# Patient Record
Sex: MALE | Race: WHITE | Employment: OTHER | URBAN - METROPOLITAN AREA
[De-identification: names, ages, dates, MRNs, and addresses within clinical notes are randomized per-mention and may not be internally consistent; named-entity substitution may affect disease eponyms.]

---

## 2017-08-30 ENCOUNTER — TRANSFERRED RECORDS (OUTPATIENT)
Dept: HEALTH INFORMATION MANAGEMENT | Facility: CLINIC | Age: 67
End: 2017-08-30

## 2017-10-10 DIAGNOSIS — J44.9 COPD (CHRONIC OBSTRUCTIVE PULMONARY DISEASE) (H): Primary | ICD-10-CM

## 2017-11-04 ASSESSMENT — ENCOUNTER SYMPTOMS
RECTAL PAIN: 0
HEMATURIA: 0
DIFFICULTY URINATING: 1
POLYDIPSIA: 0
CHILLS: 0
MYALGIAS: 1
DIARRHEA: 0
WEIGHT LOSS: 0
INSOMNIA: 1
DYSURIA: 1
NAUSEA: 0
COUGH: 1
EYE WATERING: 1
FLANK PAIN: 0
DECREASED APPETITE: 0
JAUNDICE: 0
NECK PAIN: 0
CONSTIPATION: 0
COUGH DISTURBING SLEEP: 1
WEIGHT GAIN: 1
HEMOPTYSIS: 0
SHORTNESS OF BREATH: 1
DYSPNEA ON EXERTION: 1
BOWEL INCONTINENCE: 0
POLYPHAGIA: 1
ABDOMINAL PAIN: 0
SPUTUM PRODUCTION: 1
ARTHRALGIAS: 1
HALLUCINATIONS: 0
MUSCLE CRAMPS: 0
PANIC: 0
NERVOUS/ANXIOUS: 1
EYE PAIN: 0
SNORES LOUDLY: 0
POSTURAL DYSPNEA: 0
VOMITING: 0
ALTERED TEMPERATURE REGULATION: 0
JOINT SWELLING: 0
BLOATING: 0
EYE REDNESS: 0
STIFFNESS: 0
WHEEZING: 1
FEVER: 0
HEARTBURN: 1
INCREASED ENERGY: 1
DECREASED CONCENTRATION: 1
BACK PAIN: 1
NIGHT SWEATS: 0
DOUBLE VISION: 0
FATIGUE: 1
DEPRESSION: 1
EYE IRRITATION: 1
MUSCLE WEAKNESS: 1
BLOOD IN STOOL: 0

## 2017-11-14 NOTE — TELEPHONE ENCOUNTER
APPT INFO    Date /Time: 11/16/17, 1:10pm   Reason for Appt: COPD   Ref Provider/Clinic: MONROE LOPEZ   Are there internal records? If yes, list:    Patient Contact (Y/N) & Call Details: No, referred    Action: Faxed cover sheet.     OUTSIDE RECORDS CHECKLIST     CLINIC NAME COMMENTS REC (x) IMG (x)   [facility]

## 2017-11-15 NOTE — TELEPHONE ENCOUNTER
Records Received From:  [facility]     Date/Exam/Location  (specify location if different)   Office Notes:  1/28/16, 1/31/16, 2/8/16, 4/27/16, 12/12/16, 12/14/16, 2/15/17, 5/24/17, 9/21/17, 9/26/17, 10/18/17   Radiology Reports: - xray chest 4/27/16, 12/12/16  - CT chest 1/30/17   PFT:  2/15/17

## 2017-11-16 ENCOUNTER — OFFICE VISIT (OUTPATIENT)
Dept: PULMONOLOGY | Facility: CLINIC | Age: 67
End: 2017-11-16
Payer: MEDICARE

## 2017-11-16 ENCOUNTER — PRE VISIT (OUTPATIENT)
Dept: PULMONOLOGY | Facility: CLINIC | Age: 67
End: 2017-11-16

## 2017-11-16 VITALS
RESPIRATION RATE: 16 BRPM | OXYGEN SATURATION: 95 % | DIASTOLIC BLOOD PRESSURE: 86 MMHG | HEART RATE: 97 BPM | WEIGHT: 170 LBS | SYSTOLIC BLOOD PRESSURE: 138 MMHG

## 2017-11-16 DIAGNOSIS — J44.9 COPD (CHRONIC OBSTRUCTIVE PULMONARY DISEASE) (H): ICD-10-CM

## 2017-11-16 DIAGNOSIS — J44.9 SEVERE CHRONIC OBSTRUCTIVE PULMONARY DISEASE (H): Primary | ICD-10-CM

## 2017-11-16 PROBLEM — J43.9 COPD (CHRONIC OBSTRUCTIVE PULMONARY DISEASE) WITH EMPHYSEMA (H): Status: ACTIVE | Noted: 2017-11-16

## 2017-11-16 PROBLEM — J44.1 COPD EXACERBATION (H): Status: ACTIVE | Noted: 2017-05-24

## 2017-11-16 PROBLEM — C67.9 PRIMARY CANCER OF BLADDER (H): Status: ACTIVE | Noted: 2017-05-30

## 2017-11-16 PROBLEM — R07.81 RIB PAIN ON RIGHT SIDE: Status: ACTIVE | Noted: 2017-05-24

## 2017-11-16 PROCEDURE — 93005 ELECTROCARDIOGRAM TRACING: CPT | Mod: ZF

## 2017-11-16 PROCEDURE — 93010 ELECTROCARDIOGRAM REPORT: CPT | Mod: ZP | Performed by: INTERNAL MEDICINE

## 2017-11-16 PROCEDURE — 99212 OFFICE O/P EST SF 10 MIN: CPT | Mod: ZF

## 2017-11-16 RX ORDER — TAMSULOSIN HYDROCHLORIDE 0.4 MG/1
CAPSULE ORAL
COMMUNITY
Start: 2017-09-21

## 2017-11-16 RX ORDER — ALBUTEROL SULFATE 90 UG/1
AEROSOL, METERED RESPIRATORY (INHALATION)
COMMUNITY
End: 2017-12-11

## 2017-11-16 RX ORDER — LEVALBUTEROL INHALATION SOLUTION 1.25 MG/3ML
SOLUTION RESPIRATORY (INHALATION)
COMMUNITY
End: 2017-12-11

## 2017-11-16 RX ORDER — THEOPHYLLINE 300 MG/1
TABLET, EXTENDED RELEASE ORAL
COMMUNITY
End: 2017-12-11

## 2017-11-16 RX ORDER — AZITHROMYCIN 250 MG/1
250 TABLET, FILM COATED ORAL DAILY
Qty: 30 TABLET | Refills: 11 | Status: SHIPPED | OUTPATIENT
Start: 2017-11-16 | End: 2017-12-11

## 2017-11-16 RX ORDER — BUPROPION HYDROCHLORIDE 300 MG/1
TABLET ORAL
COMMUNITY

## 2017-11-16 RX ORDER — OMEPRAZOLE 40 MG/1
CAPSULE, DELAYED RELEASE ORAL PRN
COMMUNITY
Start: 2016-02-08

## 2017-11-16 RX ORDER — CALCIUM CARBONATE 500(1250)
1 TABLET ORAL 2 TIMES DAILY
COMMUNITY
End: 2018-02-08

## 2017-11-16 RX ORDER — ALENDRONATE SODIUM 70 MG/1
TABLET ORAL
COMMUNITY
Start: 2017-09-21

## 2017-11-16 ASSESSMENT — PAIN SCALES - GENERAL: PAINLEVEL: MODERATE PAIN (5)

## 2017-11-16 NOTE — MR AVS SNAPSHOT
After Visit Summary   11/16/2017    John Yanes    MRN: [id number]           Patient Information     Date Of Birth          1950        Visit Information        Provider Department      11/16/2017 11:10 AM Holden Bushc MD [facility]        Today's Diagnoses     Severe chronic obstructive pulmonary disease (H)    -  1       Follow-ups after your visit        Additional Services     PULMONARY REHAB REFERRAL                 Follow-up notes from your care team     Return in about 3 months (around 2/16/2018).      Your next 10 appointments already scheduled     Feb 08, 2018 10:40 AM CST   (Arrive by 10:25 AM)   Return Visit with Holden Busch MD   [facility] ([facility])    [address] [place] [id number] [phone]              Future tests that were ordered for you today     Open Future Orders        Priority Expected Expires Ordered    PULMONARY REHAB REFERRAL Routine  11/16/2018 11/16/2017            Who to contact     If you have questions or need follow up information about today's clinic visit or your schedule please contact [facility] directly at [phone].  Normal or non-critical lab and imaging results will be communicated to you by MyChart, letter or phone within 4 business days after the clinic has received the results. If you do not hear from us within 7 days, please contact the clinic through MyChart or phone. If you have a critical or abnormal lab result, we will notify you by phone as soon as possible.  Submit refill requests through [facility] or call your pharmacy and they will forward the refill request to us. Please allow 3 business days for your refill to be completed.          Additional Information About Your Visit        [facility] Information     [facility] gives you secure access to your electronic health record.  If you see a primary care provider, you can also send messages to your care team and make appointments. If you have questions, please call your primary care clinic.  If you do not have a primary care provider, please call [phone] and they will assist you.        Care EveryWhere ID     This is your Care EveryWhere ID. This could be used by other organizations to access your [facility] medical records  [id number]        Your Vitals Were     Pulse Respirations Pulse Oximetry             97 16 95%          Blood Pressure from Last 3 Encounters:   11/16/17 138/86    Weight from Last 3 Encounters:   11/16/17 77.1 kg (170 lb)              We Performed the Following     EKG 12-lead complete w/read - Clinics          Today's Medication Changes          These changes are accurate as of: 11/16/17 12:50 PM.  If you have any questions, ask your nurse or doctor.               Start taking these medicines.        Dose/Directions    azithromycin 250 MG tablet   Commonly known as:  ZITHROMAX   Used for:  Severe chronic obstructive pulmonary disease (H)   Started by:  Holden Busch MD        Dose:  250 mg   Take 1 tablet (250 mg) by mouth daily   Quantity:  30 tablet   Refills:  11            Where to get your medicines      These medications were sent to [id number] - [place], [place] - [address]  [address] [facility] [address]     Phone:  [phone]     azithromycin 250 MG tablet                Primary Care Provider Office Phone # Fax #    Elvis Ifeanyi Gonzalez [phone]       [facility] [address] [address]        Equal Access to Services     [facility] AH: Hadii wang ku hadasho Sotammyali, waaxda luqadaha, qaybta kaalmada adeegyada, jovita quick. So [facility] [phone].    ATENCIÓN: Si habla español, tiene a savage disposición servicios gratuitos de asistencia lingüística. Llame al [phone].    We comply with applicable federal civil  rights laws and [place] laws. We do not discriminate on the basis of race, color, national origin, age, disability, sex, sexual orientation, or gender identity.            Thank you!     Thank you for choosing [facility]  for your care. Our goal is always to provide you with excellent care. Hearing back from our patients is one way we can continue to improve our services. Please take a few minutes to complete the written survey that you may receive in the mail after your visit with us. Thank you!             Your Updated Medication List - Protect others around you: Learn how to safely use, store and throw away your medicines at www.disposemymeds.org.          This list is accurate as of: 11/16/17 12:50 PM.  Always use your most recent med list.                   Brand Name Dispense Instructions for use Diagnosis    ADVAIR DISKUS 250-50 MCG/DOSE diskus inhaler   Generic drug:  fluticasone-salmeterol           alendronate 70 MG tablet    FOSAMAX          azithromycin 250 MG tablet    ZITHROMAX    30 tablet    Take 1 tablet (250 mg) by mouth daily    Severe chronic obstructive pulmonary disease (H)       buPROPion 300 MG 24 hr tablet    WELLBUTRIN XL          calcium carbonate 1250 MG tablet    OS-KISOHRE 500 mg [place]. [place]     Take 1 tablet by mouth 2 times daily        INCRUSE ELLIPTA 62.5 MCG/INH oral inhaler   Generic drug:  umeclidinium           levalbuterol 1.25 MG/3ML neb solution    XOPENEX          medical cannabis (Patient's own supply.  Not a prescription)      1 capsule (This is NOT a prescription, and does not certify that the patient has a qualifying medical condition for medical cannabis.  The purpose of this order is  to document that the patient reports taking medical cannabis.)        omeprazole 40 MG capsule    priLOSEC          tamsulosin 0.4 MG capsule    FLOMAX          theophylline 300 MG 12 hr tablet    THEODUR          VENTOLIN  (90 BASE) MCG/ACT Inhaler    Generic drug:  albuterol

## 2017-11-16 NOTE — PROGRESS NOTES
[facility]  Pulmonary Medicine  Visit Clinic Note  November 16, 2017         ASSESSMENT & PLAN       Severe COPD: Mr Yanes has GOLD stage III COPD.  PFTs show a lot of gas trapping and hyperinflation.  He is quite symptomatic with minimal activity.      He has frequent exacerbations.      Currently he is on a good inhaler regimen for his degree of airflow obstruction and exacerbation history.  This includes an ICS/LABA/LAMA.  He also has a rescue inhaler and nebulizer with xopenex.  We discussed using azithromycin or roflumilast to try and prevent exacerbations.  I elected to start azithromycin at 250 mg daily. His QTc was 410 ms today in clinic.  He is on theophylline, which we could consider changing to roflumilast later on.      We also discussed advanced COPD therapies.  These therapies include surgical lung volume reduction surgery, bronchoscopic lung volume reduction, or lung transplant.      There are no open trials for bronchoscopic lung volume reduction that I am aware of here, or at the [facility].  We don't offer lung volume reduction surgery here.  We did discuss the option of lung transplantation, and spoke about some of the risks and benefits.  Unfortunately, with his recent diagnosis of bladder cancer, the possibility of lung transplant will need to be postponed at least a couple of years.  He is getting active treatment for this right now.  I have discussed these options with Mr Yanes.      I have recommended that he participate in pulmonary rehab again, in addition to starting azithromycin.  He will follow up with me in 3 months with a six minute walk, chest CT, and an ABG.      When he follows up then, we will also discuss potentially switching inhalers to nebulizers and sleep study.      Sebastian Busch MD     I spent a total of 70 minutes face-to-face with John Liconaandra during today's office visit.  Over 50% of this time was spent counseling the patient and/or coordinating  care regarding his disease, and possible interventions for it.  See note for details.         Today's visit note:     Chief Complaint: John Yanes is a 67 year old year old male who is being seen for Consult (Patient is being seen for COPD consultation)      HISTORY OF PRESENT ILLNESS:  67 year old male with a history of severe COPD presents to clinic today for evaluation of his lung disease and consideration of advanced therapies.      He tells me that he has had a significant decline in his symptoms over the last year or so.  He was admitted to the hospital in 1/2016 for a pneumonia.  Since then, he has been declining.  He thinks in 2016 he was treated for about 3 -4 COPD exacerbations, and so far in 2017 he has had 2 exacerbations.  He notes that after each of these he has not been able to get back to where he was before.      He has never been intubated for respiratory failure.      He is pretty limited in his exercise tolerance.  If he bends over, he gets out of breath very quickely.  He has difficulty walking up steps and taking a shower. Sometimes will walk only 20 feet and start to feel the dyspnea.  Doesn't think he could make it around a city block.     He completed pulmonary rehab 6/2016, and did feel better after this.    .    Uses oxygen mostly during the day, not at night.     He was diagnosed with bladder cancer last year.  Had some tumors surgically removed, and now getting bCG therapy.  Going back at the end of this month for another cystoscopy.          Past Medical and Surgical History:     Past Medical History:   Diagnosis Date     Bladder cancer (H)      Chronic pain      Osteoporosis 06/2016     Vertebral fracture, osteoporotic (H) 06/2016    T5     No past surgical history on file.        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                 Social History Main Topics     Smoking status: Former Smoker     Packs/day: 2.00     Years: 35.00     Types: Cigarettes     Quit date: 1/1/2001     Smokeless tobacco: Never Used     Alcohol use Not on file     Drug use: Yes     Special: Marijuana      Comment: medical use for pain (takes pills)     Sexual activity: Not on file     Other Topics Concern     Not on file     Social History Narrative     No narrative on file            Medications:     Current Outpatient Prescriptions   Medication     fluticasone-salmeterol (ADVAIR DISKUS) 250-50 MCG/DOSE diskus inhaler     alendronate (FOSAMAX) 70 MG tablet     buPROPion (WELLBUTRIN XL) 300 MG 24 hr tablet     umeclidinium (INCRUSE ELLIPTA) 62.5 MCG/INH oral inhaler     levalbuterol (XOPENEX) 1.25 MG/3ML neb solution     omeprazole (PRILOSEC) 40 MG capsule     tamsulosin (FLOMAX) 0.4 MG capsule     theophylline (THEODUR) 300 MG 12 hr tablet     albuterol (VENTOLIN HFA) 108 (90 BASE) MCG/ACT Inhaler     medical cannabis (Patient's own supply.  Not a prescription)     calcium carbonate (OS-KISHORE 500 MG [place]. [place]) 1250 MG tablet     No current facility-administered medications for this visit.             Review of Systems:       Answers for HPI/ROS submitted by the patient on 11/4/2017   General Symptoms: Yes  Skin Symptoms: No  HENT Symptoms: No  EYE SYMPTOMS: Yes  HEART SYMPTOMS: No  LUNG SYMPTOMS: Yes  INTESTINAL SYMPTOMS: Yes  URINARY SYMPTOMS: Yes  REPRODUCTIVE SYMPTOMS: No  SKELETAL SYMPTOMS: Yes  BLOOD SYMPTOMS: No  NERVOUS SYSTEM SYMPTOMS: No  MENTAL HEALTH SYMPTOMS: Yes  Fever: No  Loss of appetite: No  Weight loss: No  Weight gain: Yes  Fatigue: Yes  Night sweats: No  Chills: No  Increased stress: Yes  Excessive hunger:  Yes  Excessive thirst: No  Feeling hot or cold when others believe the temperature is normal: No  Loss of height: No  Post-operative complications: No  Surgical site pain: No  Hallucinations: No  Change in or Loss of Energy: Yes  Hyperactivity: No  Confusion: No  Eye pain: No  Vision loss: Yes  Watery eyes: Yes  Eye bulging: No  Double vision: No  Flashing of lights: Yes  Spots: No  Floaters: No  Redness: No  Crossed eyes: No  Tunnel Vision: No  Yellowing of eyes: No  Eye irritation: Yes  Cough: Yes  Sputum or phlegm: Yes  Coughing up blood: No  Difficulty breating or shortness of breath: Yes  Snoring: No  Wheezing: Yes  Difficulty breathing on exertion: Yes  Nighttime Cough: Yes  Difficulty breathing when lying flat: No  Heart burn or indigestion: Yes  Nausea: No  Vomiting: No  Abdominal pain: No  Bloating: No  Constipation: No  Diarrhea: No  Blood in stool: No  Black stools: No  Rectal or Anal pain: No  Fecal incontinence: No  Yellowing of skin or eyes: No  Vomit with blood: No  Change in stools: No  Trouble holding urine or incontinence: Yes  Pain or burning: Yes  Trouble starting or stopping: Yes  Increased frequency of urination: Yes  Blood in urine: No  Decreased frequency of urination: No  Frequent nighttime urination: Yes  Flank pain: No  Difficulty emptying bladder: Yes  Back pain: Yes  Muscle aches: Yes  Neck pain: No  Swollen joints: No  Joint pain: Yes  Bone pain: Yes  Muscle cramps: No  Muscle weakness: Yes  Joint stiffness: No  Bone fracture: Yes  Nervous or Anxious: Yes  Depression: Yes  Trouble sleeping: Yes  Trouble thinking or concentrating: Yes  Mood changes: No  Panic attacks: No      PHYSICAL EXAM:  /86 (BP Location: Right arm, Patient Position: Chair, Cuff Size: Adult Large)  Pulse 97  Resp 16  Wt 77.1 kg (170 lb)  SpO2 95%     General: Pleasant, No apparent distress  Eyes: Anicteric  Nose: Nasal mucosa with no edema or hyperemia.  No polyps  Ears: Hearing grossly normal  Mouth: Oral  mucosa is moist, without any lesions. No oropharyngeal exudate.  Neck: supple, no thyromegaly  Lymphatics: No cervical or supraclavicular nodes  Respiratory: Chronically contracted trapezius muscle. Normal respiratory rate, but use of accessory muscles with normal respiration.  Dimished breath sounds with small bilateral wheeze biapically.  Barrel chested  Cardiac: RRR, normal S1, S2. No murmurs. No JVD  Abdomen: Soft, NT/ND   Musculoskeletal: Extremities normal. No clubbing. No cyanosis. No edema.  Skin: No rash on limited exam  Neuro: Normal mentation. Normal speech.  Psych:Normal affect           Data:   All laboratory and imaging data reviewed.      PFT:   FEV1/FVC ratio 0.30. FEV1 0.97 (33% predicted). FVC 3.26 (85% predicted). DLCO 55%  RV 7.38 L (286%), TLC 11.42 (165%)      PFT Interpretation:  GOLD Stage III (severe) airflow obstruction.  Significant gas trapping and hyperinflation. Low DLCO  Valid Maneuver    CXR: Hyperinflation, PITA nodules    Chest CT: None available.     Recent Results (from the past 168 hour(s))   General PFT Lab (Please always keep checked)    Collection Time: 11/16/17  9:45 AM   Result Value Ref Range    FVC-Pred 3.82 L    FVC-Pre 3.26 L    FVC-%Pred-Pre 85 %    FEV1-Pre 0.97 L    FEV1-%Pred-Pre 33 %    FEV1FVC-Pred 77 %    FEV1FVC-Pre 30 %    FEFMax-Pred 8.25 L/sec    FEFMax-Pre 2.03 L/sec    FEFMax-%Pred-Pre 24 %    FEF2575-Pred 2.32 L/sec    FEF2575-Pre 0.37 L/sec    MDU3219-%Pred-Pre 15 %    FEF2575-Post 0.43 L/sec    XZO7795-%Pred-Post 18 %    ExpTime-Pre 9.89 sec    FIFMax-Pre 2.30 L/sec    VC-Pred 4.61 L    VC-Pre 4.04 L    VC-%Pred-Pre 87 %    IC-Pred 3.47 L    IC-Pre 2.65 L    IC-%Pred-Pre 76 %    ERV-Pred 1.14 L    ERV-Pre 1.39 L    ERV-%Pred-Pre 121 %    FEV1FEV6-Pred 78 %    FEV1FEV6-Pre 37 %    FRCPleth-Pred 3.63 L    FRCPleth-Pre 8.77 L    FRCPleth-%Pred-Pre 241 %    RVPleth-Pred 2.58 L    RVPleth-Pre 7.38 L    RVPleth-%Pred-Pre 286 %    TLCPleth-Pred 6.92 L     TLCPleth-Pre 11.42 L    TLCPleth-%Pred-Pre 165 %    DLCOunc-Pred 25.99 ml/min/mmHg    DLCOunc-Pre 14.10 ml/min/mmHg    DLCOunc-%Pred-Pre 54 %    VA-Pre 5.52 L    VA-%Pred-Pre 83 %    FEV1SVC-Pred 64 %    FEV1SVC-Pre 24 %

## 2017-11-16 NOTE — LETTER
11/16/2017       RE: John Yanes  [address]   [place] [address]     Dear Colleague,    Thank you for referring your patient, John Yanes, to the [facility] at [facility]. Please see a copy of my visit note below.    [facility]  Pulmonary Medicine  Visit Clinic Note  November 16, 2017         ASSESSMENT & PLAN       Severe COPD: Mr Yanes has GOLD stage III COPD.  PFTs show a lot of gas trapping and hyperinflation.  He is quite symptomatic with minimal activity.      He has frequent exacerbations.      Currently he is on a good inhaler regimen for his degree of airflow obstruction and exacerbation history.  This includes an ICS/LABA/LAMA.  He also has a rescue inhaler and nebulizer with xopenex.  We discussed using azithromycin or roflumilast to try and prevent exacerbations.  I elected to start azithromycin at 250 mg daily. His QTc was 410 ms today in clinic.  He is on theophylline, which we could consider changing to roflumilast later on.      We also discussed advanced COPD therapies.  These therapies include surgical lung volume reduction surgery, bronchoscopic lung volume reduction, or lung transplant.      There are no open trials for bronchoscopic lung volume reduction that I am aware of here, or at the [facility].  We don't offer lung volume reduction surgery here.  We did discuss the option of lung transplantation, and spoke about some of the risks and benefits.  Unfortunately, with his recent diagnosis of bladder cancer, the possibility of lung transplant will need to be postponed at least a couple of years.  He is getting active treatment for this right now.  I have discussed these options with Mr Yanes.      I have recommended that he participate in pulmonary rehab again, in addition to starting azithromycin.  He will follow up with me in 3 months with a six minute walk, chest CT, and an ABG.      When he  follows up then, we will also discuss potentially switching inhalers to nebulizers and sleep study.      Sebastian Busch MD     I spent a total of 70 minutes face-to-face with John Yanes during today's office visit.  Over 50% of this time was spent counseling the patient and/or coordinating care regarding his disease, and possible interventions for it.  See note for details.         Today's visit note:     Chief Complaint: John Yanes is a 67 year old year old male who is being seen for Consult (Patient is being seen for COPD consultation)      HISTORY OF PRESENT ILLNESS:  67 year old male with a history of severe COPD presents to clinic today for evaluation of his lung disease and consideration of advanced therapies.      He tells me that he has had a significant decline in his symptoms over the last year or so.  He was admitted to the hospital in 1/2016 for a pneumonia.  Since then, he has been declining.  He thinks in 2016 he was treated for about 3 -4 COPD exacerbations, and so far in 2017 he has had 2 exacerbations.  He notes that after each of these he has not been able to get back to where he was before.      He has never been intubated for respiratory failure.      He is pretty limited in his exercise tolerance.  If he bends over, he gets out of breath very quickely.  He has difficulty walking up steps and taking a shower. Sometimes will walk only 20 feet and start to feel the dyspnea.  Doesn't think he could make it around a city block.     He completed pulmonary rehab 6/2016, and did feel better after this.    .    Uses oxygen mostly during the day, not at night.     He was diagnosed with bladder cancer last year.  Had some tumors surgically removed, and now getting bCG therapy.  Going back at the end of this month for another cystoscopy.          Past Medical and Surgical History:     Past Medical History:   Diagnosis Date     Bladder cancer (H)      Chronic pain      Osteoporosis 06/2016      Vertebral fracture, osteoporotic (H) 06/2016    T5     No past surgical history on file.        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                 Social History Main Topics     Smoking status: Former Smoker     Packs/day: 2.00     Years: 35.00     Types: Cigarettes     Quit date: 1/1/2001     Smokeless tobacco: Never Used     Alcohol use Not on file     Drug use: Yes     Special: Marijuana      Comment: medical use for pain (takes pills)     Sexual activity: Not on file     Other Topics Concern     Not on file     Social History Narrative     No narrative on file            Medications:     Current Outpatient Prescriptions   Medication     fluticasone-salmeterol (ADVAIR DISKUS) 250-50 MCG/DOSE diskus inhaler     alendronate (FOSAMAX) 70 MG tablet     buPROPion (WELLBUTRIN XL) 300 MG 24 hr tablet     umeclidinium (INCRUSE ELLIPTA) 62.5 MCG/INH oral inhaler     levalbuterol (XOPENEX) 1.25 MG/3ML neb solution     omeprazole (PRILOSEC) 40 MG capsule     tamsulosin (FLOMAX) 0.4 MG capsule     theophylline (THEODUR) 300 MG 12 hr tablet     albuterol (VENTOLIN HFA) 108 (90 BASE) MCG/ACT Inhaler     medical cannabis (Patient's own supply.  Not a prescription)     calcium carbonate (OS-KISHORE 500 MG [place] [place]) 1250 MG tablet     No current facility-administered medications for this visit.             Review of Systems:       Answers for HPI/ROS submitted by the patient on 11/4/2017   General Symptoms: Yes  Skin Symptoms: No  HENT Symptoms: No  EYE SYMPTOMS: Yes  HEART SYMPTOMS: No  LUNG SYMPTOMS: Yes  INTESTINAL SYMPTOMS: Yes  URINARY  SYMPTOMS: Yes  REPRODUCTIVE SYMPTOMS: No  SKELETAL SYMPTOMS: Yes  BLOOD SYMPTOMS: No  NERVOUS SYSTEM SYMPTOMS: No  MENTAL HEALTH SYMPTOMS: Yes  Fever: No  Loss of appetite: No  Weight loss: No  Weight gain: Yes  Fatigue: Yes  Night sweats: No  Chills: No  Increased stress: Yes  Excessive hunger: Yes  Excessive thirst: No  Feeling hot or cold when others believe the temperature is normal: No  Loss of height: No  Post-operative complications: No  Surgical site pain: No  Hallucinations: No  Change in or Loss of Energy: Yes  Hyperactivity: No  Confusion: No  Eye pain: No  Vision loss: Yes  Watery eyes: Yes  Eye bulging: No  Double vision: No  Flashing of lights: Yes  Spots: No  Floaters: No  Redness: No  Crossed eyes: No  Tunnel Vision: No  Yellowing of eyes: No  Eye irritation: Yes  Cough: Yes  Sputum or phlegm: Yes  Coughing up blood: No  Difficulty breating or shortness of breath: Yes  Snoring: No  Wheezing: Yes  Difficulty breathing on exertion: Yes  Nighttime Cough: Yes  Difficulty breathing when lying flat: No  Heart burn or indigestion: Yes  Nausea: No  Vomiting: No  Abdominal pain: No  Bloating: No  Constipation: No  Diarrhea: No  Blood in stool: No  Black stools: No  Rectal or Anal pain: No  Fecal incontinence: No  Yellowing of skin or eyes: No  Vomit with blood: No  Change in stools: No  Trouble holding urine or incontinence: Yes  Pain or burning: Yes  Trouble starting or stopping: Yes  Increased frequency of urination: Yes  Blood in urine: No  Decreased frequency of urination: No  Frequent nighttime urination: Yes  Flank pain: No  Difficulty emptying bladder: Yes  Back pain: Yes  Muscle aches: Yes  Neck pain: No  Swollen joints: No  Joint pain: Yes  Bone pain: Yes  Muscle cramps: No  Muscle weakness: Yes  Joint stiffness: No  Bone fracture: Yes  Nervous or Anxious: Yes  Depression: Yes  Trouble sleeping: Yes  Trouble thinking or concentrating: Yes  Mood changes: No  Panic attacks: No      PHYSICAL EXAM:  BP  138/86 (BP Location: Right arm, Patient Position: Chair, Cuff Size: Adult Large)  Pulse 97  Resp 16  Wt 77.1 kg (170 lb)  SpO2 95%     General: Pleasant, No apparent distress  Eyes: Anicteric  Nose: Nasal mucosa with no edema or hyperemia.  No polyps  Ears: Hearing grossly normal  Mouth: Oral mucosa is moist, without any lesions. No oropharyngeal exudate.  Neck: supple, no thyromegaly  Lymphatics: No cervical or supraclavicular nodes  Respiratory: Chronically contracted trapezius muscle. Normal respiratory rate, but use of accessory muscles with normal respiration.  Dimished breath sounds with small bilateral wheeze biapically.  Barrel chested  Cardiac: RRR, normal S1, S2. No murmurs. No JVD  Abdomen: Soft, NT/ND   Musculoskeletal: Extremities normal. No clubbing. No cyanosis. No edema.  Skin: No rash on limited exam  Neuro: Normal mentation. Normal speech.  Psych:Normal affect           Data:   All laboratory and imaging data reviewed.      PFT:   FEV1/FVC ratio 0.30. FEV1 0.97 (33% predicted). FVC 3.26 (85% predicted). DLCO 55%  RV 7.38 L (286%), TLC 11.42 (165%)      PFT Interpretation:  GOLD Stage III (severe) airflow obstruction.  Significant gas trapping and hyperinflation. Low DLCO  Valid Maneuver    CXR: Hyperinflation, PITA nodules    Chest CT: None available.     Recent Results (from the past 168 hour(s))   General PFT Lab (Please always keep checked)    Collection Time: 11/16/17  9:45 AM   Result Value Ref Range    FVC-Pred 3.82 L    FVC-Pre 3.26 L    FVC-%Pred-Pre 85 %    FEV1-Pre 0.97 L    FEV1-%Pred-Pre 33 %    FEV1FVC-Pred 77 %    FEV1FVC-Pre 30 %    FEFMax-Pred 8.25 L/sec    FEFMax-Pre 2.03 L/sec    FEFMax-%Pred-Pre 24 %    FEF2575-Pred 2.32 L/sec    FEF2575-Pre 0.37 L/sec    AIM3954-%Pred-Pre 15 %    FEF2575-Post 0.43 L/sec    INM9306-%Pred-Post 18 %    ExpTime-Pre 9.89 sec    FIFMax-Pre 2.30 L/sec    VC-Pred 4.61 L    VC-Pre 4.04 L    VC-%Pred-Pre 87 %    IC-Pred 3.47 L    IC-Pre 2.65 L     IC-%Pred-Pre 76 %    ERV-Pred 1.14 L    ERV-Pre 1.39 L    ERV-%Pred-Pre 121 %    FEV1FEV6-Pred 78 %    FEV1FEV6-Pre 37 %    FRCPleth-Pred 3.63 L    FRCPleth-Pre 8.77 L    FRCPleth-%Pred-Pre 241 %    RVPleth-Pred 2.58 L    RVPleth-Pre 7.38 L    RVPleth-%Pred-Pre 286 %    TLCPleth-Pred 6.92 L    TLCPleth-Pre 11.42 L    TLCPleth-%Pred-Pre 165 %    DLCOunc-Pred 25.99 ml/min/mmHg    DLCOunc-Pre 14.10 ml/min/mmHg    DLCOunc-%Pred-Pre 54 %    VA-Pre 5.52 L    VA-%Pred-Pre 83 %    FEV1SVC-Pred 64 %    FEV1SVC-Pre 24 %             Again, thank you for allowing me to participate in the care of your patient.      Sincerely,    Holden Busch MD

## 2017-11-16 NOTE — NURSING NOTE
Chief Complaint   Patient presents with     Consult     Patient is being seen for COPD consultation      Renée Barrios CMA at 1:18 PM on 11/16/2017

## 2017-11-17 LAB — INTERPRETATION ECG - MUSE: NORMAL

## 2017-11-20 ENCOUNTER — TELEPHONE (OUTPATIENT)
Dept: PULMONOLOGY | Facility: CLINIC | Age: 67
End: 2017-11-20

## 2017-11-20 NOTE — TELEPHONE ENCOUNTER
Per Patients request Pulmonary Rehab orders faxed to [employer]. Clinic notes, PFTs, order, and demographic information faxed to [email or web address]. They will contact patient directly for scheduling.

## 2017-11-21 LAB
DLCOUNC-%PRED-PRE: 54 %
DLCOUNC-PRE: 14.1 ML/MIN/MMHG
DLCOUNC-PRED: 25.99 ML/MIN/MMHG
ERV-%PRED-PRE: 121 %
ERV-PRE: 1.39 L
ERV-PRED: 1.14 L
EXPTIME-PRE: 9.89 SEC
FEF2575-%PRED-POST: 18 %
FEF2575-%PRED-PRE: 15 %
FEF2575-POST: 0.43 L/SEC
FEF2575-PRE: 0.37 L/SEC
FEF2575-PRED: 2.32 L/SEC
FEFMAX-%PRED-PRE: 24 %
FEFMAX-PRE: 2.03 L/SEC
FEFMAX-PRED: 8.25 L/SEC
FEV1-%PRED-PRE: 33 %
FEV1-PRE: 0.97 L
FEV1FEV6-PRE: 37 %
FEV1FEV6-PRED: 78 %
FEV1FVC-PRE: 30 %
FEV1FVC-PRED: 77 %
FEV1SVC-PRE: 24 %
FEV1SVC-PRED: 64 %
FIFMAX-PRE: 2.3 L/SEC
FRCPLETH-%PRED-PRE: 241 %
FRCPLETH-PRE: 8.77 L
FRCPLETH-PRED: 3.63 L
FVC-%PRED-PRE: 85 %
FVC-PRE: 3.26 L
FVC-PRED: 3.82 L
IC-%PRED-PRE: 76 %
IC-PRE: 2.65 L
IC-PRED: 3.47 L
RVPLETH-%PRED-PRE: 286 %
RVPLETH-PRE: 7.38 L
RVPLETH-PRED: 2.58 L
TLCPLETH-%PRED-PRE: 165 %
TLCPLETH-PRE: 11.42 L
TLCPLETH-PRED: 6.92 L
VA-%PRED-PRE: 83 %
VA-PRE: 5.52 L
VC-%PRED-PRE: 87 %
VC-PRE: 4.04 L
VC-PRED: 4.61 L

## 2017-11-29 ENCOUNTER — TRANSFERRED RECORDS (OUTPATIENT)
Dept: HEALTH INFORMATION MANAGEMENT | Facility: CLINIC | Age: 67
End: 2017-11-29

## 2017-12-18 DIAGNOSIS — J44.9 SEVERE CHRONIC OBSTRUCTIVE PULMONARY DISEASE (H): Primary | ICD-10-CM

## 2017-12-18 RX ORDER — BUDESONIDE 0.5 MG/2ML
0.5 INHALANT ORAL 2 TIMES DAILY
Qty: 360 ML | Refills: 4 | Status: SHIPPED | OUTPATIENT
Start: 2017-12-18 | End: 2019-01-09

## 2018-01-25 ASSESSMENT — ENCOUNTER SYMPTOMS
COUGH: 1
DECREASED CONCENTRATION: 0
HEMOPTYSIS: 0
SWOLLEN GLANDS: 0
NERVOUS/ANXIOUS: 1
JOINT SWELLING: 0
WHEEZING: 1
DYSURIA: 1
STIFFNESS: 0
INSOMNIA: 1
SNORES LOUDLY: 0
DIFFICULTY URINATING: 1
MYALGIAS: 1
MUSCLE CRAMPS: 0
EYE REDNESS: 0
POSTURAL DYSPNEA: 0
COUGH DISTURBING SLEEP: 1
EYE PAIN: 0
MUSCLE WEAKNESS: 0
DYSPNEA ON EXERTION: 1
EYE IRRITATION: 1
SPUTUM PRODUCTION: 1
BRUISES/BLEEDS EASILY: 1
DEPRESSION: 0
ARTHRALGIAS: 1
BACK PAIN: 0
FLANK PAIN: 0
DOUBLE VISION: 0
HEMATURIA: 0
PANIC: 0
NECK PAIN: 1
EYE WATERING: 0
SHORTNESS OF BREATH: 1

## 2018-02-08 ENCOUNTER — RADIANT APPOINTMENT (OUTPATIENT)
Dept: CT IMAGING | Facility: CLINIC | Age: 68
End: 2018-02-08
Payer: MEDICARE

## 2018-02-08 ENCOUNTER — OFFICE VISIT (OUTPATIENT)
Dept: PULMONOLOGY | Facility: CLINIC | Age: 68
End: 2018-02-08
Payer: MEDICARE

## 2018-02-08 ENCOUNTER — CARE COORDINATION (OUTPATIENT)
Dept: PULMONOLOGY | Facility: CLINIC | Age: 68
End: 2018-02-08

## 2018-02-08 VITALS
BODY MASS INDEX: 24.91 KG/M2 | HEART RATE: 91 BPM | HEIGHT: 70 IN | OXYGEN SATURATION: 94 % | WEIGHT: 174 LBS | RESPIRATION RATE: 16 BRPM | DIASTOLIC BLOOD PRESSURE: 81 MMHG | SYSTOLIC BLOOD PRESSURE: 133 MMHG

## 2018-02-08 DIAGNOSIS — J43.9 COPD (CHRONIC OBSTRUCTIVE PULMONARY DISEASE) WITH EMPHYSEMA (H): Primary | ICD-10-CM

## 2018-02-08 DIAGNOSIS — J96.11 CHRONIC RESPIRATORY FAILURE WITH HYPOXIA (H): ICD-10-CM

## 2018-02-08 DIAGNOSIS — Z85.51 PERSONAL HISTORY OF MALIGNANT NEOPLASM OF BLADDER: ICD-10-CM

## 2018-02-08 DIAGNOSIS — R06.02 SOB (SHORTNESS OF BREATH): ICD-10-CM

## 2018-02-08 DIAGNOSIS — J44.9 COPD (CHRONIC OBSTRUCTIVE PULMONARY DISEASE) (H): Primary | ICD-10-CM

## 2018-02-08 DIAGNOSIS — J44.9 SEVERE CHRONIC OBSTRUCTIVE PULMONARY DISEASE (H): ICD-10-CM

## 2018-02-08 DIAGNOSIS — J43.2 CENTRILOBULAR EMPHYSEMA (H): ICD-10-CM

## 2018-02-08 DIAGNOSIS — J44.9 STAGE 4 VERY SEVERE COPD BY GOLD CLASSIFICATION (H): Primary | ICD-10-CM

## 2018-02-08 DIAGNOSIS — R91.8 PULMONARY NODULES: ICD-10-CM

## 2018-02-08 LAB
6 MIN WALK (FT): 1035 FT
6 MIN WALK (M): 315 M
BASE EXCESS BLDA CALC-SCNC: 2.7 MMOL/L
BASOPHILS # BLD AUTO: 0 10E9/L (ref 0–0.2)
BASOPHILS NFR BLD AUTO: 0.4 %
COHGB MFR BLD: 1.2 % (ref 0–2)
DIFFERENTIAL METHOD BLD: NORMAL
EOSINOPHIL # BLD AUTO: 0.3 10E9/L (ref 0–0.7)
EOSINOPHIL NFR BLD AUTO: 5.5 %
ERYTHROCYTE [DISTWIDTH] IN BLOOD BY AUTOMATED COUNT: 12.9 % (ref 10–15)
HCO3 BLD-SCNC: 28 MMOL/L (ref 21–28)
HCT VFR BLD AUTO: 45.3 % (ref 40–53)
HGB BLD-MCNC: 14.3 G/DL (ref 13.3–17.7)
HGB BLD-MCNC: 14.9 G/DL (ref 13.3–17.7)
IMM GRANULOCYTES # BLD: 0 10E9/L (ref 0–0.4)
IMM GRANULOCYTES NFR BLD: 0.2 %
LYMPHOCYTES # BLD AUTO: 1.9 10E9/L (ref 0.8–5.3)
LYMPHOCYTES NFR BLD AUTO: 35.8 %
MCH RBC QN AUTO: 30.9 PG (ref 26.5–33)
MCHC RBC AUTO-ENTMCNC: 32.9 G/DL (ref 31.5–36.5)
MCV RBC AUTO: 94 FL (ref 78–100)
METHGB MFR BLD: 0.7 % (ref 0–3)
MONOCYTES # BLD AUTO: 0.5 10E9/L (ref 0–1.3)
MONOCYTES NFR BLD AUTO: 9.8 %
NEUTROPHILS # BLD AUTO: 2.5 10E9/L (ref 1.6–8.3)
NEUTROPHILS NFR BLD AUTO: 48.3 %
NRBC # BLD AUTO: 0 10*3/UL
NRBC BLD AUTO-RTO: 0 /100
O2/TOTAL GAS SETTING VFR VENT: 21 %
PCO2 BLD: 47 MM HG (ref 35–45)
PH BLD: 7.39 PH (ref 7.35–7.45)
PLATELET # BLD AUTO: 209 10E9/L (ref 150–450)
PO2 BLD: 72 MM HG (ref 80–105)
RADIOLOGIST FLAGS: NORMAL
RBC # BLD AUTO: 4.82 10E12/L (ref 4.4–5.9)
WBC # BLD AUTO: 5.2 10E9/L (ref 4–11)

## 2018-02-08 PROCEDURE — 85018 HEMOGLOBIN: CPT

## 2018-02-08 PROCEDURE — G0463 HOSPITAL OUTPT CLINIC VISIT: HCPCS | Mod: ZF

## 2018-02-08 PROCEDURE — 36415 COLL VENOUS BLD VENIPUNCTURE: CPT

## 2018-02-08 PROCEDURE — 82375 ASSAY CARBOXYHB QUANT: CPT

## 2018-02-08 PROCEDURE — 85025 COMPLETE CBC W/AUTO DIFF WBC: CPT

## 2018-02-08 PROCEDURE — 82803 BLOOD GASES ANY COMBINATION: CPT

## 2018-02-08 PROCEDURE — 83050 HGB METHEMOGLOBIN QUAN: CPT

## 2018-02-08 ASSESSMENT — PAIN SCALES - GENERAL: PAINLEVEL: MODERATE PAIN (4)

## 2018-02-08 NOTE — PROGRESS NOTES
Pt was in clinic today and voiced concern over current oxygen set up. RN called and spoke with [employer] who advised they have an eclipse he can have right away, if he wants something smaller would have to titrate for smaller device but it is currently on back order. RN spoke with patient who voiced would like to try smaller device. He will call oxygen company and start process. RN will send orders over to titrate for POC.   Marylin Felipe RN BSN

## 2018-02-08 NOTE — LETTER
2/8/2018       RE: John Yanes  [id number] [place] [facility] [address]     Dear Colleague,    Thank you for referring your patient, John Yanes, to the [facility] at [facility]. Please see a copy of my visit note below.    [facility]  Pulmonary Medicine  Visit Clinic Note  February 8, 2018         ASSESSMENT & PLAN       COPD - GOLD Stage 4 with an FEV1 of 28% predicted.  Mixed emphysema as well as chronic bronchitis.  He is a frequent exacerbator, but has enjoyed relatively good symptomatic control since switching to nebulized therapy as well as chronic azithromycin.  He has chronic hypoxemic respiratory failure, especially with exertion.  Additionally he has a chronic hypercapnic respiratory failure, with a PCO2 of 47 mmHg.  He is taking his medications every day and participating in pulmonary rehab.    He is interested in lung transplantation, and I do think that he would be a good candidate for a lung transplant evaluation with the exception of his recent history of bladder cancer.  The details of this diagnosis are unclear to me.  He states that it is was only a local tumor did not have any invasion.  I do not have any records of this, nor do I know when he has been declared cancer free by his urologist.  The other outstanding issue is the bilateral pulmonary nodules that were identified on today's CAT scan of his lungs.  He has had previous chest CT scans that do demonstrate nodules, but I will need to get actual images to compare to today's image to make sure things have been stable over the last 2 years.  He additionally states that he has had PET scans done in [place] approximately 10 years ago for nodules.  We will try to get these reports as well.  These details are important for me to know prior to refer him to the lung transplant service for consideration.    At this point I would like him to continue with  the current medication therapy, as well as continue to participate in pulmonary rehab.  I do think that he would be in good shape for lung transplantation as long as these outstanding issues can be resolved.    After I get the results from his urologist, as well as the previous chest CT scan images I will make a decision whether to refer him now for lung transplants versus wait a bit longer.  He will follow-up with me regardless in 6 months.      Sebastian Busch MD            Today's visit note:     Chief Complaint: John Yanes is a 68 year old year old male who is being seen for RECHECK (3 month f/u)      HISTORY OF PRESENT ILLNESS:  This is a 68-year-old male with very severe COPD from emphysema who presents to the clinic for follow-up on his symptoms.  At her last visit I started chronic azithromycin therapy just because he was a frequent exacerbate her, and he was having a lot of symptoms of cough productive of sputum.  Additionally I changed his inhalers to nebulized therapy.  He is no longer in an ICS/LABA inhaler, he is on nebulized Pulmicort and Brovana.  With these changes he notes relatively stable dyspnea, but improved cough and sputum production.  He has not had any exacerbations or visits to the emergency department since last visit.    He has been participating in pulmonary rehab and will have his last session in the coming week.  He plans to continue this therapy.    He has oxygen at home which he uses 2 L with exertion.  He denies weight loss but his wife, who is present today, thinks that he has been losing some weight.    He has been following up with his urologist regarding his history of bladder cancer, and he states that so far everything is okay.  He is not sure regarding the details of his initial diagnosis, or when he was told that he was initially cancer free.  He states that the bladder tumor was localized; it did not invade into the bladder or anywhere else in his body.  He has been  getting BCG treatments into his bladder.                Past Medical and Surgical History:     Past Medical History:   Diagnosis Date     Bladder cancer (H)      Chronic pain      COPD (chronic obstructive pulmonary disease) (H)      Depression      GERD (gastroesophageal reflux disease)      High cholesterol      Hypertension      Osteoporosis 06/2016     Vertebral fracture, osteoporotic (H) 06/2016    T5     Past Surgical History:   Procedure Laterality Date     AS ESOPHAGOSCOPY, DIAGNOSTIC       AS KNEE SCOPE, DIAGNOSTIC       CYSTOSCOPY       CYSTOSCOPY, TRANSURETHRAL RESECTION (TUR) TUMOR BLADDER, COMBINED       RELEASE CARPAL TUNNEL             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                 Social History Main Topics     Smoking status: Former Smoker     Packs/day: 2.00     Years: 35.00     Types: Cigarettes     Quit date: 1/1/2001     Smokeless tobacco: Never Used     Alcohol use Not on file     Drug use: Yes     Special: Marijuana      Comment: medical use for pain (takes pills)     Sexual activity: Not on file     Other Topics Concern     Not on file     Social History Narrative            Medications:     Current Outpatient Prescriptions   Medication     budesonide (PULMICORT) 0.5 MG/2ML neb solution     azithromycin (ZITHROMAX) 250 MG tablet     umeclidinium (INCRUSE ELLIPTA) 62.5 MCG/INH oral inhaler     levalbuterol (XOPENEX) 1.25 MG/3ML neb solution     albuterol (VENTOLIN HFA) 108 (90 BASE) MCG/ACT Inhaler     arformoterol (BROVANA) 15 MCG/2ML NEBU neb solution     alendronate (FOSAMAX) 70 MG tablet     buPROPion  "(WELLBUTRIN XL) 300 MG 24 hr tablet     omeprazole (PRILOSEC) 40 MG capsule     tamsulosin (FLOMAX) 0.4 MG capsule     medical cannabis (Patient's own supply.  Not a prescription)     order for DME     No current facility-administered medications for this visit.             Review of Systems:       Answers for HPI/ROS submitted by the patient on 1/25/2018   General Symptoms: No  Skin Symptoms: No  HENT Symptoms: No  EYE SYMPTOMS: Yes  HEART SYMPTOMS: No  LUNG SYMPTOMS: Yes  INTESTINAL SYMPTOMS: No  URINARY SYMPTOMS: Yes  REPRODUCTIVE SYMPTOMS: No  SKELETAL SYMPTOMS: Yes  BLOOD SYMPTOMS: Yes  NERVOUS SYSTEM SYMPTOMS: No  MENTAL HEALTH SYMPTOMS: Yes  Eye pain: No  Vision loss: No  Dry eyes: No  Watery eyes: No  Eye bulging: No  Double vision: No  Flashing of lights: Yes  Spots: No  Floaters: No  Redness: No  Crossed eyes: No  Tunnel Vision: No  Yellowing of eyes: No  Eye irritation: Yes  Cough: Yes  Sputum or phlegm: Yes  Coughing up blood: No  Difficulty breating or shortness of breath: Yes  Snoring: No  Wheezing: Yes  Difficulty breathing on exertion: Yes  Nighttime Cough: Yes  Difficulty breathing when lying flat: No  Trouble holding urine or incontinence: Yes  Pain or burning: Yes  Trouble starting or stopping: No  Increased frequency of urination: Yes  Blood in urine: No  Decreased frequency of urination: No  Frequent nighttime urination: Yes  Flank pain: No  Difficulty emptying bladder: Yes  Back pain: No  Muscle aches: Yes  Neck pain: Yes  Swollen joints: No  Joint pain: Yes  Bone pain: Yes  Muscle cramps: No  Muscle weakness: No  Joint stiffness: No  Bone fracture: No  Anemia: No  Swollen glands: No  Easy bleeding or bruising: Yes  Edema or swelling: No  Nervous or Anxious: Yes  Depression: No  Trouble sleeping: Yes  Trouble thinking or concentrating: No  Mood changes: No  Panic attacks: No        PHYSICAL EXAM:  /81  Pulse 91  Resp 16  Ht 1.778 m (5' 10\")  Wt 78.9 kg (174 lb)  SpO2 94%  BMI 24.97 " kg/m2     General: pleasant, no distress  Eyes: Anicteric  Nose: Nasal mucosa with no edema or hyperemia.  No polyps  Ears: Hearing grossly normal  Mouth: Oral mucosa is moist, without any lesions. No oropharyngeal exudate.  Neck: supple, no thyromegaly  Lymphatics: No cervical or supraclavicular nodes  Respiratory: Decreased breath sounds, small amount of wheezing in the lower lobes bilaterally.  Cardiac: RRR, normal S1, S2. No murmurs. No JVD  Abdomen: Soft, NT/ND  Musculoskeletal: There is some muscle cachexia.  No clubbing. No cyanosis. No edema.  Skin: No rash on limited exam  Neuro: Normal mentation. Normal speech.  Psych:Normal affect           Data:   All laboratory and imaging data reviewed.      Labs:  Alpha 1 antitripysin: MM phenotype. Level is 169      PFT:   2/8/18: FEV1/FVC ratio 0.28. FEV1 0.89 (28 % predicted). FVC 3.17 (76 % predicted).  11/17/2017: FEV1-FVC ratio 0.30.  FEV1 0.97 L (33%).  FVC 3.26 L (85%).Total lung capacity of 11.42 L which is 165% predicted.  Residual volume 7.38 L which is 286% predicted.  Diffusion capacity 54% predicted    PFT Interpretation:  There is very severe airflow obstruction.  No significant change since November 2017.  Pulmonary function testing back in November showed significant gas trapping as well as hyperinflation and a low diffusion capacity.  Valid Maneuver    6 minute walk: his oxygen saturation was 95% on room air at rest.  With walking his heart rate increased to 111 bpm and his oxygen saturation dropped to 87%: At which time he took a rest.  A full 6 minute walk on 2 L nasal cannula was performed next.  This showed that he walked 315 m and had a significant desaturation from 96% down to 90% with a heart rate up to 108 bpm.  His Yu score was 2 before walking and 5 after walking.      Chest CT: I have reviewed the chest CT scan and agree with the radiologist read below:  Debris in the trachea. Advanced destructive centrilobular emphysema  predominantly  in the apices. Central bronchiectasis and bronchial wall  thickening. No pneumothorax or pleural effusion. Multiple bilateral  pulmonary nodules, including a spiculated 10 mm left upper lobe nodule  (series 4 image 54) and a spiculated 6 cm right upper lobe nodule  (image 58). Scattered calcified granuloma. Diffuse air trapping on end  or images. Bilateral hyperinflation.     The heart size is normal. Three vessel coronary artery calcific  atherosclerosis. No pericardial effusion. Normal caliber of the  thoracic great vessels. No thoracic adenopathy.     Limited views of the abdomen reveal no worrisome pathology. Multiple  age-indeterminate mild compression deformities in the thoracolumbar  spine at T6, T9, and L1. Diffuse bony demineralization.         IMPRESSION:   1. Multiple bilateral pulmonary nodules including a suspicious  spiculated 10 mm nodule in the left upper lobe, consider PET/CT and/or  tissue sampling for further evaluation.  2. 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  3. Diffuse bony demineralization with multiple age indeterminate  presumably pathologic impression fractures of the thoracolumbar spine.    Of note: He has had chest CT scans in January 2017 as well as January 2016 that both show bilateral pulmonary nodules.  It is difficult to know based on the description whether the size of the nodules have changed, however he also says that he has had a PET scan back from 2004 [place] looking at these nodules.    Recent Results (from the past 168 hour(s))   6 minute walk test    Collection Time: 02/08/18 12:00 AM   Result Value Ref Range    6 min walk (FT) 1035 ft    6 Min Walk (M) 315 m   CBC with platelets differential    Collection Time: 02/08/18  8:28 AM   Result Value Ref Range    WBC 5.2 4.0 - 11.0 10e9/L    RBC Count 4.82 4.4 - 5.9 10e12/L    Hemoglobin 14.9 13.3 - 17.7 g/dL    Hematocrit 45.3 40.0 - 53.0 %    MCV 94 78 - 100 fl    MCH 30.9 26.5  - 33.0 pg    MCHC 32.9 31.5 - 36.5 g/dL    RDW 12.9 10.0 - 15.0 %    Platelet Count 209 150 - 450 10e9/L    Diff Method Automated Method     % Neutrophils 48.3 %    % Lymphocytes 35.8 %    % Monocytes 9.8 %    % Eosinophils 5.5 %    % Basophils 0.4 %    % Immature Granulocytes 0.2 %    Nucleated RBCs 0 0 /100    Absolute Neutrophil 2.5 1.6 - 8.3 10e9/L    Absolute Lymphocytes 1.9 0.8 - 5.3 10e9/L    Absolute Monocytes 0.5 0.0 - 1.3 10e9/L    Absolute Eosinophils 0.3 0.0 - 0.7 10e9/L    Absolute Basophils 0.0 0.0 - 0.2 10e9/L    Abs Immature Granulocytes 0.0 0 - 0.4 10e9/L    Absolute Nucleated RBC 0.0    CT Chest w/o contrast    Collection Time: 02/08/18  9:06 AM   Result Value Ref Range    Radiologist flags Lung nodule    General PFT Lab (Please always keep checked)    Collection Time: 02/08/18  9:12 AM   Result Value Ref Range    FVC-Pred 4.14 L    FVC-Pre 3.17 L    FVC-%Pred-Pre 76 %    FEV1-Pre 0.89 L    FEV1-%Pred-Pre 28 %    FEV1FVC-Pred 76 %    FEV1FVC-Pre 28 %    FEFMax-Pred 8.23 L/sec    FEFMax-Pre 2.40 L/sec    FEFMax-%Pred-Pre 29 %    FEF2575-Pred 2.44 L/sec    FEF2575-Pre 0.35 L/sec    CTB6487-%Pred-Pre 14 %    ExpTime-Pre 10.16 sec    FIFMax-Pre 3.66 L/sec    FEV1FEV6-Pred 78 %    FEV1FEV6-Pre 35 %    FIO2-Pre 21.00 %   Blood gas arterial    Collection Time: 02/08/18  9:21 AM   Result Value Ref Range    pH Arterial 7.39 7.35 - 7.45 pH    pCO2 Arterial 47 (H) 35 - 45 mm Hg    pO2 Arterial 72 (L) 80 - 105 mm Hg    Bicarbonate Arterial 28 21 - 28 mmol/L    Base Excess Art 2.7 mmol/L    FIO2 21    Carbon monoxide    Collection Time: 02/08/18  9:21 AM   Result Value Ref Range    Carbon Monoxide 1.2 0 - 2 %   Hemoglobin    Collection Time: 02/08/18  9:21 AM   Result Value Ref Range    Hemoglobin 14.3 13.3 - 17.7 g/dL   Methemoglobin    Collection Time: 02/08/18  9:21 AM   Result Value Ref Range    Methemoglobin 0.7 0 - 3 %            Again, thank you for allowing me to participate in the care of your patient.       Sincerely,    Holden Busch MD

## 2018-02-08 NOTE — PROGRESS NOTES
[facility]  Pulmonary Medicine  Visit Clinic Note  February 8, 2018         ASSESSMENT & PLAN       COPD - GOLD Stage 4 with an FEV1 of 28% predicted.  Mixed emphysema as well as chronic bronchitis.  He is a frequent exacerbator, but has enjoyed relatively good symptomatic control since switching to nebulized therapy as well as chronic azithromycin.  He has chronic hypoxemic respiratory failure, especially with exertion.  Additionally he has a chronic hypercapnic respiratory failure, with a PCO2 of 47 mmHg.  He is taking his medications every day and participating in pulmonary rehab.    He is interested in lung transplantation, and I do think that he would be a good candidate for a lung transplant evaluation with the exception of his recent history of bladder cancer.  The details of this diagnosis are unclear to me.  He states that it is was only a local tumor did not have any invasion.  I do not have any records of this, nor do I know when he has been declared cancer free by his urologist.  The other outstanding issue is the bilateral pulmonary nodules that were identified on today's CAT scan of his lungs.  He has had previous chest CT scans that do demonstrate nodules, but I will need to get actual images to compare to today's image to make sure things have been stable over the last 2 years.  He additionally states that he has had PET scans done in [place] approximately 10 years ago for nodules.  We will try to get these reports as well.  These details are important for me to know prior to refer him to the lung transplant service for consideration.    At this point I would like him to continue with the current medication therapy, as well as continue to participate in pulmonary rehab.  I do think that he would be in good shape for lung transplantation as long as these outstanding issues can be resolved.    After I get the results from his urologist, as well as the previous chest CT scan  images I will make a decision whether to refer him now for lung transplants versus wait a bit longer.  He will follow-up with me regardless in 6 months.      Sebastian Busch MD            Today's visit note:     Chief Complaint: John Yanes is a 68 year old year old male who is being seen for RECHECK (3 month f/u)      HISTORY OF PRESENT ILLNESS:  This is a 68-year-old male with very severe COPD from emphysema who presents to the clinic for follow-up on his symptoms.  At her last visit I started chronic azithromycin therapy just because he was a frequent exacerbate her, and he was having a lot of symptoms of cough productive of sputum.  Additionally I changed his inhalers to nebulized therapy.  He is no longer in an ICS/LABA inhaler, he is on nebulized Pulmicort and Brovana.  With these changes he notes relatively stable dyspnea, but improved cough and sputum production.  He has not had any exacerbations or visits to the emergency department since last visit.    He has been participating in pulmonary rehab and will have his last session in the coming week.  He plans to continue this therapy.    He has oxygen at home which he uses 2 L with exertion.  He denies weight loss but his wife, who is present today, thinks that he has been losing some weight.    He has been following up with his urologist regarding his history of bladder cancer, and he states that so far everything is okay.  He is not sure regarding the details of his initial diagnosis, or when he was told that he was initially cancer free.  He states that the bladder tumor was localized; it did not invade into the bladder or anywhere else in his body.  He has been getting BCG treatments into his bladder.                Past Medical and Surgical History:     Past Medical History:   Diagnosis Date     Bladder cancer (H)      Chronic pain      COPD (chronic obstructive pulmonary disease) (H)      Depression      GERD (gastroesophageal reflux disease)      High  cholesterol      Hypertension      Osteoporosis 06/2016     Vertebral fracture, osteoporotic (H) 06/2016    T5     Past Surgical History:   Procedure Laterality Date     AS ESOPHAGOSCOPY, DIAGNOSTIC       AS KNEE SCOPE, DIAGNOSTIC       CYSTOSCOPY       CYSTOSCOPY, TRANSURETHRAL RESECTION (TUR) TUMOR BLADDER, COMBINED       RELEASE CARPAL TUNNEL             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                 Social History Main Topics     Smoking status: Former Smoker     Packs/day: 2.00     Years: 35.00     Types: Cigarettes     Quit date: 1/1/2001     Smokeless tobacco: Never Used     Alcohol use Not on file     Drug use: Yes     Special: Marijuana      Comment: medical use for pain (takes pills)     Sexual activity: Not on file     Other Topics Concern     Not on file     Social History Narrative            Medications:     Current Outpatient Prescriptions   Medication     budesonide (PULMICORT) 0.5 MG/2ML neb solution     azithromycin (ZITHROMAX) 250 MG tablet     umeclidinium (INCRUSE ELLIPTA) 62.5 MCG/INH oral inhaler     levalbuterol (XOPENEX) 1.25 MG/3ML neb solution     albuterol (VENTOLIN HFA) 108 (90 BASE) MCG/ACT Inhaler     arformoterol (BROVANA) 15 MCG/2ML NEBU neb solution     alendronate (FOSAMAX) 70 MG tablet     buPROPion (WELLBUTRIN XL) 300 MG 24 hr tablet     omeprazole (PRILOSEC) 40 MG capsule     tamsulosin (FLOMAX) 0.4 MG capsule     medical cannabis (Patient's own supply.  Not a prescription)     order for DME     No current facility-administered medications for this visit.             Review of Systems:  "      Answers for HPI/ROS submitted by the patient on 1/25/2018   General Symptoms: No  Skin Symptoms: No  HENT Symptoms: No  EYE SYMPTOMS: Yes  HEART SYMPTOMS: No  LUNG SYMPTOMS: Yes  INTESTINAL SYMPTOMS: No  URINARY SYMPTOMS: Yes  REPRODUCTIVE SYMPTOMS: No  SKELETAL SYMPTOMS: Yes  BLOOD SYMPTOMS: Yes  NERVOUS SYSTEM SYMPTOMS: No  MENTAL HEALTH SYMPTOMS: Yes  Eye pain: No  Vision loss: No  Dry eyes: No  Watery eyes: No  Eye bulging: No  Double vision: No  Flashing of lights: Yes  Spots: No  Floaters: No  Redness: No  Crossed eyes: No  Tunnel Vision: No  Yellowing of eyes: No  Eye irritation: Yes  Cough: Yes  Sputum or phlegm: Yes  Coughing up blood: No  Difficulty breating or shortness of breath: Yes  Snoring: No  Wheezing: Yes  Difficulty breathing on exertion: Yes  Nighttime Cough: Yes  Difficulty breathing when lying flat: No  Trouble holding urine or incontinence: Yes  Pain or burning: Yes  Trouble starting or stopping: No  Increased frequency of urination: Yes  Blood in urine: No  Decreased frequency of urination: No  Frequent nighttime urination: Yes  Flank pain: No  Difficulty emptying bladder: Yes  Back pain: No  Muscle aches: Yes  Neck pain: Yes  Swollen joints: No  Joint pain: Yes  Bone pain: Yes  Muscle cramps: No  Muscle weakness: No  Joint stiffness: No  Bone fracture: No  Anemia: No  Swollen glands: No  Easy bleeding or bruising: Yes  Edema or swelling: No  Nervous or Anxious: Yes  Depression: No  Trouble sleeping: Yes  Trouble thinking or concentrating: No  Mood changes: No  Panic attacks: No        PHYSICAL EXAM:  /81  Pulse 91  Resp 16  Ht 1.778 m (5' 10\")  Wt 78.9 kg (174 lb)  SpO2 94%  BMI 24.97 kg/m2     General: pleasant, no distress  Eyes: Anicteric  Nose: Nasal mucosa with no edema or hyperemia.  No polyps  Ears: Hearing grossly normal  Mouth: Oral mucosa is moist, without any lesions. No oropharyngeal exudate.  Neck: supple, no thyromegaly  Lymphatics: No cervical or " supraclavicular nodes  Respiratory: Decreased breath sounds, small amount of wheezing in the lower lobes bilaterally.  Cardiac: RRR, normal S1, S2. No murmurs. No JVD  Abdomen: Soft, NT/ND  Musculoskeletal: There is some muscle cachexia.  No clubbing. No cyanosis. No edema.  Skin: No rash on limited exam  Neuro: Normal mentation. Normal speech.  Psych:Normal affect           Data:   All laboratory and imaging data reviewed.      Labs:  Alpha 1 antitripysin: MM phenotype. Level is 169      PFT:   2/8/18: FEV1/FVC ratio 0.28. FEV1 0.89 (28 % predicted). FVC 3.17 (76 % predicted).  11/17/2017: FEV1-FVC ratio 0.30.  FEV1 0.97 L (33%).  FVC 3.26 L (85%).Total lung capacity of 11.42 L which is 165% predicted.  Residual volume 7.38 L which is 286% predicted.  Diffusion capacity 54% predicted    PFT Interpretation:  There is very severe airflow obstruction.  No significant change since November 2017.  Pulmonary function testing back in November showed significant gas trapping as well as hyperinflation and a low diffusion capacity.  Valid Maneuver    6 minute walk: his oxygen saturation was 95% on room air at rest.  With walking his heart rate increased to 111 bpm and his oxygen saturation dropped to 87%: At which time he took a rest.  A full 6 minute walk on 2 L nasal cannula was performed next.  This showed that he walked 315 m and had a significant desaturation from 96% down to 90% with a heart rate up to 108 bpm.  His Yu score was 2 before walking and 5 after walking.      Chest CT: I have reviewed the chest CT scan and agree with the radiologist read below:  Debris in the trachea. Advanced destructive centrilobular emphysema  predominantly in the apices. Central bronchiectasis and bronchial wall  thickening. No pneumothorax or pleural effusion. Multiple bilateral  pulmonary nodules, including a spiculated 10 mm left upper lobe nodule  (series 4 image 54) and a spiculated 6 cm right upper lobe nodule  (image 58).  Scattered calcified granuloma. Diffuse air trapping on end  or images. Bilateral hyperinflation.     The heart size is normal. Three vessel coronary artery calcific  atherosclerosis. No pericardial effusion. Normal caliber of the  thoracic great vessels. No thoracic adenopathy.     Limited views of the abdomen reveal no worrisome pathology. Multiple  age-indeterminate mild compression deformities in the thoracolumbar  spine at T6, T9, and L1. Diffuse bony demineralization.         IMPRESSION:   1. Multiple bilateral pulmonary nodules including a suspicious  spiculated 10 mm nodule in the left upper lobe, consider PET/CT and/or  tissue sampling for further evaluation.  2. 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  3. Diffuse bony demineralization with multiple age indeterminate  presumably pathologic impression fractures of the thoracolumbar spine.    Of note: He has had chest CT scans in January 2017 as well as January 2016 that both show bilateral pulmonary nodules.  It is difficult to know based on the description whether the size of the nodules have changed, however he also says that he has had a PET scan back from 2004 [place] looking at these nodules.    Recent Results (from the past 168 hour(s))   6 minute walk test    Collection Time: 02/08/18 12:00 AM   Result Value Ref Range    6 min walk (FT) 1035 ft    6 Min Walk (M) 315 m   CBC with platelets differential    Collection Time: 02/08/18  8:28 AM   Result Value Ref Range    WBC 5.2 4.0 - 11.0 10e9/L    RBC Count 4.82 4.4 - 5.9 10e12/L    Hemoglobin 14.9 13.3 - 17.7 g/dL    Hematocrit 45.3 40.0 - 53.0 %    MCV 94 78 - 100 fl    MCH 30.9 26.5 - 33.0 pg    MCHC 32.9 31.5 - 36.5 g/dL    RDW 12.9 10.0 - 15.0 %    Platelet Count 209 150 - 450 10e9/L    Diff Method Automated Method     % Neutrophils 48.3 %    % Lymphocytes 35.8 %    % Monocytes 9.8 %    % Eosinophils 5.5 %    % Basophils 0.4 %    % Immature Granulocytes  0.2 %    Nucleated RBCs 0 0 /100    Absolute Neutrophil 2.5 1.6 - 8.3 10e9/L    Absolute Lymphocytes 1.9 0.8 - 5.3 10e9/L    Absolute Monocytes 0.5 0.0 - 1.3 10e9/L    Absolute Eosinophils 0.3 0.0 - 0.7 10e9/L    Absolute Basophils 0.0 0.0 - 0.2 10e9/L    Abs Immature Granulocytes 0.0 0 - 0.4 10e9/L    Absolute Nucleated RBC 0.0    CT Chest w/o contrast    Collection Time: 02/08/18  9:06 AM   Result Value Ref Range    Radiologist flags Lung nodule    General PFT Lab (Please always keep checked)    Collection Time: 02/08/18  9:12 AM   Result Value Ref Range    FVC-Pred 4.14 L    FVC-Pre 3.17 L    FVC-%Pred-Pre 76 %    FEV1-Pre 0.89 L    FEV1-%Pred-Pre 28 %    FEV1FVC-Pred 76 %    FEV1FVC-Pre 28 %    FEFMax-Pred 8.23 L/sec    FEFMax-Pre 2.40 L/sec    FEFMax-%Pred-Pre 29 %    FEF2575-Pred 2.44 L/sec    FEF2575-Pre 0.35 L/sec    GEE5674-%Pred-Pre 14 %    ExpTime-Pre 10.16 sec    FIFMax-Pre 3.66 L/sec    FEV1FEV6-Pred 78 %    FEV1FEV6-Pre 35 %    FIO2-Pre 21.00 %   Blood gas arterial    Collection Time: 02/08/18  9:21 AM   Result Value Ref Range    pH Arterial 7.39 7.35 - 7.45 pH    pCO2 Arterial 47 (H) 35 - 45 mm Hg    pO2 Arterial 72 (L) 80 - 105 mm Hg    Bicarbonate Arterial 28 21 - 28 mmol/L    Base Excess Art 2.7 mmol/L    FIO2 21    Carbon monoxide    Collection Time: 02/08/18  9:21 AM   Result Value Ref Range    Carbon Monoxide 1.2 0 - 2 %   Hemoglobin    Collection Time: 02/08/18  9:21 AM   Result Value Ref Range    Hemoglobin 14.3 13.3 - 17.7 g/dL   Methemoglobin    Collection Time: 02/08/18  9:21 AM   Result Value Ref Range    Methemoglobin 0.7 0 - 3 %

## 2018-02-08 NOTE — MR AVS SNAPSHOT
After Visit Summary   2/8/2018    John Yanes    MRN: [id number]           Patient Information     Date Of Birth          1950        Visit Information        Provider Department      2/8/2018 10:40 AM Holden Busch MD [facility]        Today's Diagnoses     Stage 4 very severe COPD by GOLD classification (H)    -  1    Chronic respiratory failure with hypoxia (H)        Personal history of malignant neoplasm of bladder        Pulmonary nodules        Centrilobular emphysema (H)           Follow-ups after your visit        Follow-up notes from your care team     Return in about 6 months (around 8/8/2018).      Your next 10 appointments already scheduled     Aug 09, 2018  8:40 AM CDT   (Arrive by 8:25 AM)   Return Visit with MD TANYA Pompa [facility] ([facility])    [address] [place] [address]  [address] [place] [id number] [phone]              Who to contact     If you have questions or need follow up information about today's clinic visit or your schedule please contact [facility] directly at [phone].  Normal or non-critical lab and imaging results will be communicated to you by MyChart, letter or phone within 4 business days after the clinic has received the results. If you do not hear from us within 7 days, please contact the clinic through [employer] or phone. If you have a critical or abnormal lab result, we will notify you by phone as soon as possible.  Submit refill requests through [employer] or call your pharmacy and they will forward the refill request to us. Please allow 3 business days for your refill to be completed.          Additional Information About Your Visit        MyChart Information     [employer] gives you secure access to your electronic health record. If you see a primary care provider, you can also send messages to your care  "team and make appointments. If you have questions, please call your primary care clinic.  If you do not have a primary care provider, please call [phone] and they will assist you.        Care EveryWhere ID     This is your Care EveryWhere ID. This could be used by other organizations to access your [place] medical records  [id number]        Your Vitals Were     Pulse Respirations Height Pulse Oximetry BMI (Body Mass Index)       91 16 1.778 m (5' 10\") 94% 24.97 kg/m2        Blood Pressure from Last 3 Encounters:   02/08/18 133/81   11/16/17 138/86    Weight from Last 3 Encounters:   02/08/18 78.9 kg (174 lb)   11/16/17 77.1 kg (170 lb)              We Performed the Following     Blood gas arterial     Carbon monoxide     Hemoglobin     Methemoglobin          Today's Medication Changes          These changes are accurate as of 2/8/18  5:15 PM.  If you have any questions, ask your nurse or doctor.               Start taking these medicines.        Dose/Directions    order for DME   Used for:  COPD (chronic obstructive pulmonary disease) (H), SOB (shortness of breath)   Started by:  Holden Busch MD        Equipment being ordered: Oxygen Please titrate patient for portable oxygen concentrator. Keeping sats above 90%, ok to try pulse dose if needed.   Quantity:  1 Device   Refills:  0            Where to get your medicines      Some of these will need a paper prescription and others can be bought over the counter.  Ask your nurse if you have questions.     Bring a paper prescription for each of these medications     order for DME                Primary Care Provider Office Phone # Fax #    Elvis Ifeanyi Gonzalez [phone]       [facility] [address] [facility] [address]        Equal Access to Services     RADHA TERRAZAS : [employer], jovita amos. So [facility] [phone].    ATENCIÓN: Si " gregorio zhao, tiene a savage disposición servicios gratuitos de asistencia lingüística. Rafa negrete [phone].    We comply with applicable federal civil rights laws and [place] laws. We do not discriminate on the basis of race, color, national origin, age, disability, sex, sexual orientation, or gender identity.            Thank you!     Thank you for choosing [facility]  for your care. Our goal is always to provide you with excellent care. Hearing back from our patients is one way we can continue to improve our services. Please take a few minutes to complete the written survey that you may receive in the mail after your visit with us. Thank you!             Your Updated Medication List - Protect others around you: Learn how to safely use, store and throw away your medicines at www.disposemymeds.org.          This list is accurate as of 2/8/18  5:15 PM.  Always use your most recent med list.                   Brand Name Dispense Instructions for use Diagnosis    albuterol 108 (90 BASE) MCG/ACT Inhaler    VENTOLIN HFA    3 Inhaler    Inhale 2 puffs into the lungs every 6 hours as needed for shortness of breath / dyspnea or wheezing    Severe chronic obstructive pulmonary disease (H)       alendronate 70 MG tablet    FOSAMAX          arformoterol 15 MCG/2ML Nebu neb solution    BROVANA    360 mL    Take 2 mLs (15 mcg) by nebulization 2 times daily    Severe chronic obstructive pulmonary disease (H)       azithromycin 250 MG tablet    ZITHROMAX    90 tablet    Take 1 tablet (250 mg) by mouth daily    Severe chronic obstructive pulmonary disease (H)       budesonide 0.5 MG/2ML neb solution    PULMICORT    360 mL    Take 2 mLs (0.5 mg) by nebulization 2 times daily    Severe chronic obstructive pulmonary disease (H)       buPROPion 300 MG 24 hr tablet    WELLBUTRIN XL          levalbuterol 1.25 MG/3ML neb solution    XOPENEX    540 mL    Take 3 mLs (1.25 mg) by nebulization every 6 hours  as needed for shortness of breath / dyspnea or wheezing    Severe chronic obstructive pulmonary disease (H)       medical cannabis (Patient's own supply.  Not a prescription)      1 capsule (This is NOT a prescription, and does not certify that the patient has a qualifying medical condition for medical cannabis.  The purpose of this order is  to document that the patient reports taking medical cannabis.)        omeprazole 40 MG capsule    priLOSEC          order for DME     1 Device    Equipment being ordered: Oxygen Please titrate patient for portable oxygen concentrator. Keeping sats above 90%, ok to try pulse dose if needed.    COPD (chronic obstructive pulmonary disease) (H), SOB (shortness of breath)       tamsulosin 0.4 MG capsule    FLOMAX          umeclidinium 62.5 MCG/INH oral inhaler    INCRUSE ELLIPTA    3 Inhaler    Inhale 1 puff into the lungs daily    Severe chronic obstructive pulmonary disease (H)

## 2018-02-09 LAB
EXPTIME-PRE: 10.16 SEC
FEF2575-%PRED-PRE: 14 %
FEF2575-PRE: 0.35 L/SEC
FEF2575-PRED: 2.44 L/SEC
FEFMAX-%PRED-PRE: 29 %
FEFMAX-PRE: 2.4 L/SEC
FEFMAX-PRED: 8.23 L/SEC
FEV1-%PRED-PRE: 28 %
FEV1-PRE: 0.89 L
FEV1FEV6-PRE: 35 %
FEV1FEV6-PRED: 78 %
FEV1FVC-PRE: 28 %
FEV1FVC-PRED: 76 %
FIFMAX-PRE: 3.66 L/SEC
FIO2-PRE: 21 %
FVC-%PRED-PRE: 76 %
FVC-PRE: 3.17 L
FVC-PRED: 4.14 L

## 2018-02-26 ENCOUNTER — DOCUMENTATION ONLY (OUTPATIENT)
Dept: PULMONOLOGY | Facility: CLINIC | Age: 68
End: 2018-02-26

## 2018-02-26 NOTE — PROGRESS NOTES
Received fax from John's Urologist.      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  This was diagnosed in 3/2016.  There was no muscular invasion.  He had this removed under cystoscopy, and has been getting regular bCG treatments.  He had a repeat biopsy in 11/2017 that only showed cystitis, but no malignancy.

## 2018-03-19 DIAGNOSIS — J44.1 OBSTRUCTIVE CHRONIC BRONCHITIS WITH EXACERBATION (H): ICD-10-CM

## 2018-03-19 DIAGNOSIS — J44.9 COPD (CHRONIC OBSTRUCTIVE PULMONARY DISEASE) (H): Primary | ICD-10-CM

## 2018-03-19 DIAGNOSIS — R06.09 EXERTIONAL DYSPNEA: ICD-10-CM

## 2018-06-04 ENCOUNTER — TRANSFERRED RECORDS (OUTPATIENT)
Dept: HEALTH INFORMATION MANAGEMENT | Facility: CLINIC | Age: 68
End: 2018-06-04

## 2018-10-11 ASSESSMENT — ENCOUNTER SYMPTOMS
BOWEL INCONTINENCE: 0
EYE REDNESS: 0
INCREASED ENERGY: 1
EYE WATERING: 1
BACK PAIN: 0
RECTAL PAIN: 0
MUSCLE WEAKNESS: 0
NAUSEA: 1
JOINT SWELLING: 0
SMELL DISTURBANCE: 0
POSTURAL DYSPNEA: 1
TROUBLE SWALLOWING: 0
CONSTIPATION: 0
NECK PAIN: 1
EYE IRRITATION: 0
SPUTUM PRODUCTION: 1
HEMATURIA: 0
VOMITING: 0
JAUNDICE: 0
SNORES LOUDLY: 0
ABDOMINAL PAIN: 0
SORE THROAT: 0
FLANK PAIN: 0
HEMOPTYSIS: 0
HEARTBURN: 1
SINUS PAIN: 0
COUGH: 1
DIFFICULTY URINATING: 1
NECK MASS: 0
HOARSE VOICE: 0
SHORTNESS OF BREATH: 1
COUGH DISTURBING SLEEP: 1
DOUBLE VISION: 0
MUSCLE CRAMPS: 0
SINUS CONGESTION: 1
DIARRHEA: 1
FATIGUE: 1
BLOATING: 0
ARTHRALGIAS: 1
DYSPNEA ON EXERTION: 1
WHEEZING: 1
TASTE DISTURBANCE: 0
MYALGIAS: 1
EYE PAIN: 0
STIFFNESS: 0
BLOOD IN STOOL: 0
DYSURIA: 0

## 2018-10-23 ENCOUNTER — PATIENT OUTREACH (OUTPATIENT)
Dept: CARE COORDINATION | Facility: CLINIC | Age: 68
End: 2018-10-23

## 2018-10-25 ENCOUNTER — OFFICE VISIT (OUTPATIENT)
Dept: PULMONOLOGY | Facility: CLINIC | Age: 68
End: 2018-10-25
Payer: MEDICARE

## 2018-10-25 VITALS
SYSTOLIC BLOOD PRESSURE: 124 MMHG | HEART RATE: 101 BPM | HEIGHT: 70 IN | WEIGHT: 170 LBS | DIASTOLIC BLOOD PRESSURE: 79 MMHG | RESPIRATION RATE: 16 BRPM | OXYGEN SATURATION: 92 % | BODY MASS INDEX: 24.34 KG/M2

## 2018-10-25 DIAGNOSIS — J43.2 CENTRILOBULAR EMPHYSEMA (H): ICD-10-CM

## 2018-10-25 DIAGNOSIS — J43.2 CENTRILOBULAR EMPHYSEMA (H): Primary | ICD-10-CM

## 2018-10-25 DIAGNOSIS — J43.9 COPD (CHRONIC OBSTRUCTIVE PULMONARY DISEASE) WITH EMPHYSEMA (H): Primary | ICD-10-CM

## 2018-10-25 DIAGNOSIS — J44.9 SEVERE CHRONIC OBSTRUCTIVE PULMONARY DISEASE (H): ICD-10-CM

## 2018-10-25 LAB
6 MIN WALK (FT): 1050 FT
6 MIN WALK (M): 320 M

## 2018-10-25 PROCEDURE — G0463 HOSPITAL OUTPT CLINIC VISIT: HCPCS | Mod: ZF

## 2018-10-25 ASSESSMENT — PAIN SCALES - GENERAL: PAINLEVEL: MODERATE PAIN (5)

## 2018-10-25 NOTE — PROGRESS NOTES
[facility]  Pulmonary Medicine  Visit Clinic Note  October 25, 2018         ASSESSMENT & PLAN       Very severe COPD: He has emphysema as well as chronic bronchitis.  Previous pulmonary function testing shows his FEV1 is below 30% predicted.  He suffers from a lot of shortness of breath on a regular basis, but he is doing his best to get to the gym and perform exercises.  This includes walking a mile and using the elliptical.  He has required steroids on 2 occasions since her last visit, and I think it be best to keep him on his daily azithromycin.  He should also continue his Pulmicort and Brovana as well as Spiriva.    He is on essentially maximum pulmonary medications at this time, and is doing his best to keep his muscles in condition with exercise.  Unfortunately with this recent recurrence of bladder cancer, lung transplantation is not going to be an option for him anytime soon.  I suspect that he will have to wait at least 3 years if not 5 years to remain free of cancer before he would be considered for transplant.  This puts him pretty close to our center's age cutoff of 75 years old for transplant.    An alternative possibility for him would be lung volume reduction.  We talked about the possibility of bronchoscopic lung volume reduction with endobronchial valves.  I will have him repeat pulmonary function testing today.  I will also discuss his case with our interventional pulmonary physicians to see if he would be an appropriate candidate.    Has already received the flu vaccine.     Sebastian Busch MD          Today's visit note:     Chief Complaint: John Yanes is a 68 year old year old male who is being seen for RECHECK (6 month follow up )      HISTORY OF PRESENT ILLNESS:    Is a 68-year-old male with a history of COPD and bladder cancer who is presenting for pulmonary follow-up.    Since her last visit, he has been diagnosed with recurrent bladder cancer in August.  He had a  cystoscopy with biopsy of a bladder mass at that time.  He is Toi completed 6 weeks of radiation therapy as well as 2 rounds of chemotherapy.  He apparently had a bunch of reimaging of his chest and abdomen looking for metastases, but there is no evidence of these.  The cancer is felt to be localized.  Symptomatically, he is having urgency and needs to urinate 6-7 times at night while he is trying to get to sleep.    From a pulmonary perspective, he has stable severe shortness of breath.  He is completed pulmonary rehab, and now he is going to the gym regularly.  He walks about 1 mile a couple times a week while using 4 L of oxygen.  He also uses the elliptical machine.  In the last year he has had 2 exacerbations requiring Medrol Dosepaks.  He is taking Mucinex every day to help thin out his secretions.    While at home, he uses 2 L of oxygen during exertion.         Past Medical and Surgical History:     Past Medical History:   Diagnosis Date     Bladder cancer (H)      Chronic pain      COPD (chronic obstructive pulmonary disease) (H)      Depression      GERD (gastroesophageal reflux disease)      High cholesterol      Hypertension      Osteoporosis 06/2016     Vertebral fracture, osteoporotic (H) 06/2016    T5     Past Surgical History:   Procedure Laterality Date     AS ESOPHAGOSCOPY, DIAGNOSTIC       AS KNEE SCOPE, DIAGNOSTIC       CYSTOSCOPY       CYSTOSCOPY, TRANSURETHRAL RESECTION (TUR) TUMOR BLADDER, COMBINED       RELEASE CARPAL TUNNEL             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                 Social History Main  Topics     Smoking status: Former Smoker     Packs/day: 2.00     Years: 35.00     Types: Cigarettes     Quit date: 1/1/2001     Smokeless tobacco: Never Used     Alcohol use Not on file     Drug use: Yes     Special: Marijuana      Comment: medical use for pain (takes pills)     Sexual activity: Not on file     Other Topics Concern     Not on file     Social History Narrative            Medications:     Current Outpatient Prescriptions   Medication     albuterol (VENTOLIN HFA) 108 (90 BASE) MCG/ACT Inhaler     alendronate (FOSAMAX) 70 MG tablet     arformoterol (BROVANA) 15 MCG/2ML NEBU neb solution     budesonide (PULMICORT) 0.5 MG/2ML neb solution     buPROPion (WELLBUTRIN XL) 300 MG 24 hr tablet     levalbuterol (XOPENEX) 1.25 MG/3ML neb solution     medical cannabis (Patient's own supply.  Not a prescription)     omeprazole (PRILOSEC) 40 MG capsule     order for DME     order for DME     tamsulosin (FLOMAX) 0.4 MG capsule     umeclidinium (INCRUSE ELLIPTA) 62.5 MCG/INH oral inhaler     azithromycin (ZITHROMAX) 250 MG tablet     No current facility-administered medications for this visit.             Review of Systems:       Answers for HPI/ROS submitted by the patient on 10/11/2018   General Symptoms: Yes  Skin Symptoms: No  HENT Symptoms: Yes  EYE SYMPTOMS: Yes  HEART SYMPTOMS: No  LUNG SYMPTOMS: Yes  INTESTINAL SYMPTOMS: Yes  URINARY SYMPTOMS: Yes  REPRODUCTIVE SYMPTOMS: No  SKELETAL SYMPTOMS: Yes  BLOOD SYMPTOMS: No  NERVOUS SYSTEM SYMPTOMS: No  MENTAL HEALTH SYMPTOMS: No  Fatigue: Yes  Change in or Loss of Energy: Yes  Ear pain: No  Ear discharge: No  Tinnitus: No  Nosebleeds: No  Congestion: Yes  Sinus pain: No  Trouble swallowing: No   Voice hoarseness: No  Mouth sores: No  Sore throat: No  Tooth pain: No  Gum tenderness: No  Bleeding gums: No  Change in taste: No  Change in sense of smell: No  Dry mouth: Yes  Hearing aid used: No  Neck lump: No  Eye pain: No  Vision loss: Yes  Dry eyes: No  Watery eyes:  "Yes  Eye bulging: No  Double vision: No  Flashing of lights: Yes  Spots: No  Floaters: Yes  Redness: No  Crossed eyes: No  Tunnel Vision: No  Yellowing of eyes: No  Eye irritation: No  Cough: Yes  Sputum or phlegm: Yes  Coughing up blood: No  Difficulty breating or shortness of breath: Yes  Snoring: No  Wheezing: Yes  Difficulty breathing on exertion: Yes  Nighttime Cough: Yes  Difficulty breathing when lying flat: Yes  Heart burn or indigestion: Yes  Nausea: Yes  Vomiting: No  Abdominal pain: No  Bloating: No  Constipation: No  Diarrhea: Yes  Blood in stool: No  Black stools: No  Rectal or Anal pain: No  Fecal incontinence: No  Yellowing of skin or eyes: No  Vomit with blood: No  Change in stools: No  Trouble holding urine or incontinence: Yes  Pain or burning: No  Trouble starting or stopping: No  Increased frequency of urination: Yes  Blood in urine: No  Decreased frequency of urination: No  Frequent nighttime urination: Yes  Flank pain: No  Difficulty emptying bladder: Yes  Back pain: No  Muscle aches: Yes  Neck pain: Yes  Swollen joints: No  Joint pain: Yes  Bone pain: Yes  Muscle cramps: No  Muscle weakness: No  Joint stiffness: No  Bone fracture: No        PHYSICAL EXAM:  /79 (BP Location: Right arm, Patient Position: Chair, Cuff Size: Adult Regular)  Pulse 101  Resp 16  Ht 1.778 m (5' 10\")  Wt 77.1 kg (170 lb)  SpO2 92%  BMI 24.39 kg/m2     General:  No apparent distress  Eyes: Anicteric  Nose: Nasal mucosa with no edema or hyperemia.  No polyps  Ears: Hearing grossly normal  Mouth: Oral mucosa is moist, without any lesions. No oropharyngeal exudate.  Neck: supple, no thyromegaly  Lymphatics: No cervical or supraclavicular nodes  Respiratory: Very distant breath sounds, no wheezing.  Occasional accessory muscle use during conversation.  Cardiac: RRR, normal S1, S2. No murmurs. No JVD  Abdomen: Soft, NT/ND  Musculoskeletal: Extremities normal. No clubbing. No cyanosis. No edema.  Skin: No rash on " limited exam  Neuro: Normal mentation. Normal speech.  Psych:Normal affect           Data:   All laboratory and imaging data reviewed.      PFT:   6-minute walk test shows a room air saturation of 92%.  He desaturates down to 87% while on room air.  On 2 L, he is able to continue the 6-minute walk and his oxygen levels dropped to as low as 91%.  Pre-walk Yu score is 3.  Post Walk, Yu score is 8

## 2018-10-25 NOTE — Clinical Note
This is a clinic patient of mine with very severe COPD and a lot of gas trapping. He just was diagnosed with recurrent bladder cancer, so he will not be a lung transplant candidate anytime soon.  He may be someone to consider for BLVR.  Is this something that insurance is reimbursing us for? Should we try to put some kind of protocol in place, or do we already have one? I could talk to people at [facility] and see what they do for BLVR in COPD patients since they have done a lot of it if we still need to get a general protocol in place. [place]

## 2018-10-25 NOTE — MR AVS SNAPSHOT
After Visit Summary   10/25/2018    John Yanes    MRN: [id number]           Patient Information     Date Of Birth          1950        Visit Information        Provider Department      10/25/2018 2:35 PM Holden Busch MD [facility]        Today's Diagnoses     Centrilobular emphysema (H)    -  1       Follow-ups after your visit        Follow-up notes from your care team     Return in about 1 year (around 10/25/2019).      Your next 10 appointments already scheduled     Oct 21, 2019 10:05 AM CDT   (Arrive by 9:50 AM)   Return Visit with Holden Busch MD   [facility] ([facility])    [address] [place] [address]  [address] [place] [id number] [phone]              Who to contact     If you have questions or need follow up information about today's clinic visit or your schedule please contact [facility] directly at [phone].  Normal or non-critical lab and imaging results will be communicated to you by [employer], letter or phone within 4 business days after the clinic has received the results. If you do not hear from us within 7 days, please contact the clinic through [employer] or phone. If you have a critical or abnormal lab result, we will notify you by phone as soon as possible.  Submit refill requests through [employer] or call your pharmacy and they will forward the refill request to us. Please allow 3 business days for your refill to be completed.          Additional Information About Your Visit        [employer] Information     [employer] gives you secure access to your electronic health record. If you see a primary care provider, you can also send messages to your care team and make appointments. If you have questions, please call your primary care clinic.  If you do not have a primary care provider, please call [phone] and they will assist you.       "  Care EveryWhere ID     This is your Care EveryWhere ID. This could be used by other organizations to access your [place] medical records  [id number]        Your Vitals Were     Pulse Respirations Height Pulse Oximetry BMI (Body Mass Index)       101 16 1.778 m (5' 10\") 92% 24.39 kg/m2        Blood Pressure from Last 3 Encounters:   10/25/18 124/79   02/08/18 133/81   11/16/17 138/86    Weight from Last 3 Encounters:   10/25/18 77.1 kg (170 lb)   02/08/18 78.9 kg (174 lb)   11/16/17 77.1 kg (170 lb)               Primary Care Provider Office Phone # Fax #    Elvis Gonzalez [phone]       [address] [address]        Equal Access to Services     [facility] : Hadii wang ty hadasho Soomaali, waaxda luqadaha, qaybta kaalmada zofiayada, jovita mead . So [facility] [phone].    ATENCIÓN: Si habla español, tiene a savage disposición servicios gratuitos de asistencia lingüística. [facility] [phone].    We comply with applicable federal civil rights laws and [place] laws. We do not discriminate on the basis of race, color, national origin, age, disability, sex, sexual orientation, or gender identity.            Thank you!     Thank you for choosing [facility]  for your care. Our goal is always to provide you with excellent care. Hearing back from our patients is one way we can continue to improve our services. Please take a few minutes to complete the written survey that you may receive in the mail after your visit with us. Thank you!             Your Updated Medication List - Protect others around you: Learn how to safely use, store and throw away your medicines at www.disposemymeds.org.          This list is accurate as of 10/25/18  5:39 PM.  Always use your most recent med list.                   Brand Name Dispense Instructions for use Diagnosis    albuterol 108 (90 Base) MCG/ACT inhaler    " VENTOLIN HFA    3 Inhaler    Inhale 2 puffs into the lungs every 6 hours as needed for shortness of breath / dyspnea or wheezing    Severe chronic obstructive pulmonary disease (H)       alendronate 70 MG tablet    FOSAMAX          arformoterol 15 MCG/2ML Nebu neb solution    BROVANA    360 mL    Take 2 mLs (15 mcg) by nebulization 2 times daily    Severe chronic obstructive pulmonary disease (H)       azithromycin 250 MG tablet    ZITHROMAX    90 tablet    Take 1 tablet (250 mg) by mouth daily    Severe chronic obstructive pulmonary disease (H)       budesonide 0.5 MG/2ML neb solution    PULMICORT    360 mL    Take 2 mLs (0.5 mg) by nebulization 2 times daily    Severe chronic obstructive pulmonary disease (H)       buPROPion 300 MG 24 hr tablet    WELLBUTRIN XL          levalbuterol 1.25 MG/3ML neb solution    XOPENEX    540 mL    Take 3 mLs (1.25 mg) by nebulization every 6 hours as needed for shortness of breath / dyspnea or wheezing    Severe chronic obstructive pulmonary disease (H)       medical cannabis (Patient's own supply.  Not a prescription)      1 capsule (This is NOT a prescription, and does not certify that the patient has a qualifying medical condition for medical cannabis.  The purpose of this order is  to document that the patient reports taking medical cannabis.)        omeprazole 40 MG capsule    priLOSEC          * order for DME     1 Device    Equipment being ordered: Oxygen Please titrate patient for portable oxygen concentrator. Keeping sats above 90%, ok to try pulse dose if needed.    COPD (chronic obstructive pulmonary disease) (H), SOB (shortness of breath)       * order for DME     1 Units    Equipment being ordered: Please provide Simply Go Mini to provide O2 at 2L/ min, pulsed dose with exertion, via nasal canula    COPD (chronic obstructive pulmonary disease) (H), Exertional dyspnea       tamsulosin 0.4 MG capsule    FLOMAX          umeclidinium 62.5 MCG/INH inhaler    INCRUSE  ELLIPTA    3 Inhaler    Inhale 1 puff into the lungs daily    Severe chronic obstructive pulmonary disease (H)       * Notice:  This list has 2 medication(s) that are the same as other medications prescribed for you. Read the directions carefully, and ask your doctor or other care provider to review them with you.

## 2018-10-25 NOTE — LETTER
10/25/2018       RE: John Yanes  [address]   [place] [address]     Dear Colleague,    Thank you for referring your patient, John Yanes, to the [facility] at [facility]. Please see a copy of my visit note below.    [facility]  Pulmonary Medicine  Visit Clinic Note  October 25, 2018         ASSESSMENT & PLAN       Very severe COPD: He has emphysema as well as chronic bronchitis.  Previous pulmonary function testing shows his FEV1 is below 30% predicted.  He suffers from a lot of shortness of breath on a regular basis, but he is doing his best to get to the gym and perform exercises.  This includes walking a mile and using the elliptical.  He has required steroids on 2 occasions since her last visit, and I think it be best to keep him on his daily azithromycin.  He should also continue his Pulmicort and Brovana as well as Spiriva.    He is on essentially maximum pulmonary medications at this time, and is doing his best to keep his muscles in condition with exercise.  Unfortunately with this recent recurrence of bladder cancer, lung transplantation is not going to be an option for him anytime soon.  I suspect that he will have to wait at least 3 years if not 5 years to remain free of cancer before he would be considered for transplant.  This puts him pretty close to our center's age cutoff of 75 years old for transplant.    An alternative possibility for him would be lung volume reduction.  We talked about the possibility of bronchoscopic lung volume reduction with endobronchial valves.  I will have him repeat pulmonary function testing today.  I will also discuss his case with our interventional pulmonary physicians to see if he would be an appropriate candidate.    Has already received the flu vaccine.     Sebastian Busch MD          Today's visit note:     Chief Complaint: John Yanes is a 68 year old year old  male who is being seen for RECHECK (6 month follow up )      HISTORY OF PRESENT ILLNESS:    Is a 68-year-old male with a history of COPD and bladder cancer who is presenting for pulmonary follow-up.    Since her last visit, he has been diagnosed with recurrent bladder cancer in August.  He had a cystoscopy with biopsy of a bladder mass at that time.  He is Toi completed 6 weeks of radiation therapy as well as 2 rounds of chemotherapy.  He apparently had a bunch of reimaging of his chest and abdomen looking for metastases, but there is no evidence of these.  The cancer is felt to be localized.  Symptomatically, he is having urgency and needs to urinate 6-7 times at night while he is trying to get to sleep.    From a pulmonary perspective, he has stable severe shortness of breath.  He is completed pulmonary rehab, and now he is going to the gym regularly.  He walks about 1 mile a couple times a week while using 4 L of oxygen.  He also uses the elliptical machine.  In the last year he has had 2 exacerbations requiring Medrol Dosepaks.  He is taking Mucinex every day to help thin out his secretions.    While at home, he uses 2 L of oxygen during exertion.         Past Medical and Surgical History:     Past Medical History:   Diagnosis Date     Bladder cancer (H)      Chronic pain      COPD (chronic obstructive pulmonary disease) (H)      Depression      GERD (gastroesophageal reflux disease)      High cholesterol      Hypertension      Osteoporosis 06/2016     Vertebral fracture, osteoporotic (H) 06/2016    T5     Past Surgical History:   Procedure Laterality Date     AS ESOPHAGOSCOPY, DIAGNOSTIC       AS KNEE SCOPE, DIAGNOSTIC       CYSTOSCOPY       CYSTOSCOPY, TRANSURETHRAL RESECTION (TUR) TUMOR BLADDER, COMBINED       RELEASE CARPAL TUNNEL             Family History:     Family History   Problem Relation Age of Onset     Dementia Mother      Liver Cancer Father      Heart Failure Brother               Social  History:     Social History     Social History     Marital status:      Spouse name: N/A     Number of children: N/A     Years of education: N/A     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                 Social History Main Topics     Smoking status: Former Smoker     Packs/day: 2.00     Years: 35.00     Types: Cigarettes     Quit date: 1/1/2001     Smokeless tobacco: Never Used     Alcohol use Not on file     Drug use: Yes     Special: Marijuana      Comment: medical use for pain (takes pills)     Sexual activity: Not on file     Other Topics Concern     Not on file     Social History Narrative            Medications:     Current Outpatient Prescriptions   Medication     albuterol (VENTOLIN HFA) 108 (90 BASE) MCG/ACT Inhaler     alendronate (FOSAMAX) 70 MG tablet     arformoterol (BROVANA) 15 MCG/2ML NEBU neb solution     budesonide (PULMICORT) 0.5 MG/2ML neb solution     buPROPion (WELLBUTRIN XL) 300 MG 24 hr tablet     levalbuterol (XOPENEX) 1.25 MG/3ML neb solution     medical cannabis (Patient's own supply.  Not a prescription)     omeprazole (PRILOSEC) 40 MG capsule     order for DME     order for DME     tamsulosin (FLOMAX) 0.4 MG capsule     umeclidinium (INCRUSE ELLIPTA) 62.5 MCG/INH oral inhaler     azithromycin (ZITHROMAX) 250 MG tablet     No current facility-administered medications for this visit.             Review of Systems:       Answers for HPI/ROS submitted by the patient on 10/11/2018   General Symptoms: Yes  Skin Symptoms: No  HENT Symptoms: Yes  EYE SYMPTOMS: Yes  HEART SYMPTOMS: No  LUNG SYMPTOMS: Yes  INTESTINAL SYMPTOMS: Yes  URINARY SYMPTOMS: Yes  REPRODUCTIVE SYMPTOMS: No  SKELETAL SYMPTOMS: Yes  BLOOD SYMPTOMS: No  NERVOUS SYSTEM SYMPTOMS: No  MENTAL HEALTH SYMPTOMS: No  Fatigue: Yes  Change in or Loss of Energy: Yes  Ear pain: No  Ear discharge: No  Tinnitus: No  Nosebleeds: No  Congestion: Yes  Sinus pain:  "No  Trouble swallowing: No   Voice hoarseness: No  Mouth sores: No  Sore throat: No  Tooth pain: No  Gum tenderness: No  Bleeding gums: No  Change in taste: No  Change in sense of smell: No  Dry mouth: Yes  Hearing aid used: No  Neck lump: No  Eye pain: No  Vision loss: Yes  Dry eyes: No  Watery eyes: Yes  Eye bulging: No  Double vision: No  Flashing of lights: Yes  Spots: No  Floaters: Yes  Redness: No  Crossed eyes: No  Tunnel Vision: No  Yellowing of eyes: No  Eye irritation: No  Cough: Yes  Sputum or phlegm: Yes  Coughing up blood: No  Difficulty breating or shortness of breath: Yes  Snoring: No  Wheezing: Yes  Difficulty breathing on exertion: Yes  Nighttime Cough: Yes  Difficulty breathing when lying flat: Yes  Heart burn or indigestion: Yes  Nausea: Yes  Vomiting: No  Abdominal pain: No  Bloating: No  Constipation: No  Diarrhea: Yes  Blood in stool: No  Black stools: No  Rectal or Anal pain: No  Fecal incontinence: No  Yellowing of skin or eyes: No  Vomit with blood: No  Change in stools: No  Trouble holding urine or incontinence: Yes  Pain or burning: No  Trouble starting or stopping: No  Increased frequency of urination: Yes  Blood in urine: No  Decreased frequency of urination: No  Frequent nighttime urination: Yes  Flank pain: No  Difficulty emptying bladder: Yes  Back pain: No  Muscle aches: Yes  Neck pain: Yes  Swollen joints: No  Joint pain: Yes  Bone pain: Yes  Muscle cramps: No  Muscle weakness: No  Joint stiffness: No  Bone fracture: No        PHYSICAL EXAM:  /79 (BP Location: Right arm, Patient Position: Chair, Cuff Size: Adult Regular)  Pulse 101  Resp 16  Ht 1.778 m (5' 10\")  Wt 77.1 kg (170 lb)  SpO2 92%  BMI 24.39 kg/m2     General:  No apparent distress  Eyes: Anicteric  Nose: Nasal mucosa with no edema or hyperemia.  No polyps  Ears: Hearing grossly normal  Mouth: Oral mucosa is moist, without any lesions. No oropharyngeal exudate.  Neck: supple, no thyromegaly  Lymphatics: No " cervical or supraclavicular nodes  Respiratory: Very distant breath sounds, no wheezing.  Occasional accessory muscle use during conversation.  Cardiac: RRR, normal S1, S2. No murmurs. No JVD  Abdomen: Soft, NT/ND  Musculoskeletal: Extremities normal. No clubbing. No cyanosis. No edema.  Skin: No rash on limited exam  Neuro: Normal mentation. Normal speech.  Psych:Normal affect           Data:   All laboratory and imaging data reviewed.      PFT:   6-minute walk test shows a room air saturation of 92%.  He desaturates down to 87% while on room air.  On 2 L, he is able to continue the 6-minute walk and his oxygen levels dropped to as low as 91%.  Pre-walk Yu score is 3.  Post Walk, Yu score is 8      Holden Busch MD

## 2018-11-01 LAB
DLCOUNC-%PRED-PRE: 55 %
DLCOUNC-PRE: 14.37 ML/MIN/MMHG
DLCOUNC-PRED: 25.77 ML/MIN/MMHG
ERV-%PRED-PRE: 174 %
ERV-PRE: 1.97 L
ERV-PRED: 1.13 L
EXPTIME-PRE: 12.98 SEC
FEF2575-%PRED-PRE: 11 %
FEF2575-PRE: 0.27 L/SEC
FEF2575-PRED: 2.4 L/SEC
FEFMAX-%PRED-PRE: 30 %
FEFMAX-PRE: 2.51 L/SEC
FEFMAX-PRED: 8.16 L/SEC
FEV1-%PRED-PRE: 27 %
FEV1-PRE: 0.85 L
FEV1FEV6-PRE: 37 %
FEV1FEV6-PRED: 78 %
FEV1FVC-PRE: 26 %
FEV1FVC-PRED: 74 %
FEV1SVC-PRE: 23 %
FEV1SVC-PRED: 68 %
FIFMAX-PRE: 1.84 L/SEC
FIO2-PRE: 24 %
FRCPLETH-%PRED-PRE: 262 %
FRCPLETH-PRE: 9.54 L
FRCPLETH-PRED: 3.64 L
FVC-%PRED-PRE: 79 %
FVC-PRE: 3.26 L
FVC-PRED: 4.11 L
IC-%PRED-PRE: 52 %
IC-PRE: 1.81 L
IC-PRED: 3.46 L
RVPLETH-%PRED-PRE: 291 %
RVPLETH-PRE: 7.57 L
RVPLETH-PRED: 2.6 L
TLCPLETH-%PRED-PRE: 163 %
TLCPLETH-PRE: 11.35 L
TLCPLETH-PRED: 6.92 L
VA-%PRED-PRE: 88 %
VA-PRE: 5.85 L
VC-%PRED-PRE: 82 %
VC-PRE: 3.78 L
VC-PRED: 4.59 L

## 2019-01-14 ENCOUNTER — TELEPHONE (OUTPATIENT)
Dept: PULMONOLOGY | Facility: CLINIC | Age: 69
End: 2019-01-14

## 2019-03-07 ENCOUNTER — MYC REFILL (OUTPATIENT)
Dept: PULMONOLOGY | Facility: CLINIC | Age: 69
End: 2019-03-07

## 2019-03-07 DIAGNOSIS — J44.9 SEVERE CHRONIC OBSTRUCTIVE PULMONARY DISEASE (H): ICD-10-CM

## 2019-03-07 RX ORDER — ARFORMOTEROL TARTRATE 15 UG/2ML
15 SOLUTION RESPIRATORY (INHALATION) 2 TIMES DAILY
Qty: 360 ML | Refills: 4 | Status: SHIPPED | OUTPATIENT
Start: 2019-03-07 | End: 2020-04-05

## 2019-03-07 RX ORDER — BUDESONIDE 0.5 MG/2ML
0.5 INHALANT ORAL 2 TIMES DAILY
Qty: 360 ML | Refills: 4 | Status: SHIPPED | OUTPATIENT
Start: 2019-03-07 | End: 2020-04-05

## 2019-03-07 RX ORDER — ALBUTEROL SULFATE 90 UG/1
2 AEROSOL, METERED RESPIRATORY (INHALATION) EVERY 6 HOURS PRN
Qty: 3 INHALER | Refills: 4 | Status: SHIPPED | OUTPATIENT
Start: 2019-03-07 | End: 2020-04-14

## 2019-03-07 RX ORDER — AZITHROMYCIN 250 MG/1
250 TABLET, FILM COATED ORAL DAILY
Qty: 90 TABLET | Refills: 4 | Status: SHIPPED | OUTPATIENT
Start: 2019-03-07 | End: 2019-12-03

## 2019-03-07 RX ORDER — LEVALBUTEROL INHALATION SOLUTION 1.25 MG/3ML
1 SOLUTION RESPIRATORY (INHALATION) EVERY 6 HOURS PRN
Qty: 540 ML | Refills: 4 | Status: SHIPPED | OUTPATIENT
Start: 2019-03-07 | End: 2020-07-20

## 2019-05-29 ENCOUNTER — TRANSFERRED RECORDS (OUTPATIENT)
Dept: HEALTH INFORMATION MANAGEMENT | Facility: CLINIC | Age: 69
End: 2019-05-29

## 2019-10-07 ASSESSMENT — ENCOUNTER SYMPTOMS
ARTHRALGIAS: 1
JAUNDICE: 0
NAUSEA: 0
BLOOD IN STOOL: 0
POSTURAL DYSPNEA: 1
ALTERED TEMPERATURE REGULATION: 0
POLYPHAGIA: 0
SNORES LOUDLY: 0
COUGH DISTURBING SLEEP: 1
FLANK PAIN: 0
DIFFICULTY URINATING: 1
DECREASED APPETITE: 0
SKIN CHANGES: 0
NIGHT SWEATS: 0
EYE PAIN: 0
HEARTBURN: 1
HEMOPTYSIS: 0
RECTAL PAIN: 0
EYE REDNESS: 0
EYE IRRITATION: 0
MUSCLE CRAMPS: 0
NECK PAIN: 1
STIFFNESS: 0
SHORTNESS OF BREATH: 1
MYALGIAS: 0
NAIL CHANGES: 0
WEIGHT GAIN: 1
DYSPNEA ON EXERTION: 1
DYSURIA: 0
FEVER: 0
POOR WOUND HEALING: 1
VOMITING: 0
HALLUCINATIONS: 0
INCREASED ENERGY: 0
CHILLS: 0
CONSTIPATION: 0
BRUISES/BLEEDS EASILY: 0
MUSCLE WEAKNESS: 0
WHEEZING: 1
FATIGUE: 1
COUGH: 1
BACK PAIN: 1
ABDOMINAL PAIN: 0
POLYDIPSIA: 0
BLOATING: 0
EYE WATERING: 0
HEMATURIA: 0
JOINT SWELLING: 0
DIARRHEA: 0
WEIGHT LOSS: 0
SWOLLEN GLANDS: 0
SPUTUM PRODUCTION: 1
BOWEL INCONTINENCE: 0
DOUBLE VISION: 0

## 2019-10-21 ENCOUNTER — OFFICE VISIT (OUTPATIENT)
Dept: PULMONOLOGY | Facility: CLINIC | Age: 69
End: 2019-10-21
Payer: MEDICARE

## 2019-10-21 VITALS
OXYGEN SATURATION: 92 % | HEIGHT: 70 IN | BODY MASS INDEX: 24.73 KG/M2 | DIASTOLIC BLOOD PRESSURE: 76 MMHG | WEIGHT: 172.7 LBS | HEART RATE: 90 BPM | SYSTOLIC BLOOD PRESSURE: 116 MMHG

## 2019-10-21 DIAGNOSIS — J44.9 STAGE 4 VERY SEVERE COPD BY GOLD CLASSIFICATION (H): Primary | ICD-10-CM

## 2019-10-21 DIAGNOSIS — J41.1 MUCOPURULENT CHRONIC BRONCHITIS (H): ICD-10-CM

## 2019-10-21 PROBLEM — J44.1 COPD EXACERBATION (H): Status: RESOLVED | Noted: 2017-05-24 | Resolved: 2019-10-21

## 2019-10-21 LAB
BACTERIA SPEC CULT: ABNORMAL
BACTERIA SPEC CULT: ABNORMAL
GRAM STN SPEC: ABNORMAL
Lab: ABNORMAL
SPECIMEN SOURCE: ABNORMAL
SPECIMEN SOURCE: ABNORMAL

## 2019-10-21 PROCEDURE — 87102 FUNGUS ISOLATION CULTURE: CPT

## 2019-10-21 PROCEDURE — 87205 SMEAR GRAM STAIN: CPT

## 2019-10-21 PROCEDURE — 87015 SPECIMEN INFECT AGNT CONCNTJ: CPT

## 2019-10-21 PROCEDURE — 87116 MYCOBACTERIA CULTURE: CPT

## 2019-10-21 PROCEDURE — 87149 DNA/RNA DIRECT PROBE: CPT

## 2019-10-21 PROCEDURE — 87206 SMEAR FLUORESCENT/ACID STAI: CPT

## 2019-10-21 PROCEDURE — G0463 HOSPITAL OUTPT CLINIC VISIT: HCPCS | Mod: ZF

## 2019-10-21 PROCEDURE — 87070 CULTURE OTHR SPECIMN AEROBIC: CPT

## 2019-10-21 PROCEDURE — 87158 CULTURE TYPING ADDED METHOD: CPT

## 2019-10-21 ASSESSMENT — MIFFLIN-ST. JEOR: SCORE: 1549.61

## 2019-10-21 ASSESSMENT — PAIN SCALES - GENERAL: PAINLEVEL: NO PAIN (0)

## 2019-10-21 NOTE — LETTER
10/21/2019       RE: John Yanes  [address] [facility] [address]     Dear Colleague,    Thank you for referring your patient, John Yanes, to the [facility] LUNG [facility]ENCE [facility] at [facility]. Please see a copy of my visit note below.    [facility]  Pulmonary Medicine  Visit Clinic Note  October 21, 2019         ASSESSMENT & PLAN       Very severe COPD: Currently still symptomatic from his breathlessness standpoint as well as bronchitis.  He is on nebulized Brovana, Pulmicort, and inhaled Incruse Ellipta.  He is on daily azithromycin and as needed albuterol.    Unfortunately, he has not been exercising as much as he used to.  I encouraged him to get back into this, just because he needs to keep his muscles of his condition is possible.  For his bronchitis and sputum production, I have asked him to give me a sputum specimen.  I will get a look for Gram stain, aerobic culture, fungal culture, AFB stain and culture.    He is symptomatic despite being on an ICS, LABA, LAMA.  He is using Incruse Ellipta.  It is possible that he is not getting all of this medication into his lungs due to a low inspiratory capacity.  There is a nebulized long acting muscarinic antagonist, glycopyrrolate.  I will prescribe this medication for his.  There may be of prior authorization process.  Essentially, that his current inhaler regimen is not working, I do believe that he needs all nebulized therapies due to his significant gas trapping and low inspiratory capacity.    We discussed the idea of endobronchial valves and lung transplantation again.  Given his recent bladder cancer diagnosis, this does put on hold a referral for lung transplant.  My discussions with the transplant team, they may consider discussing his case after 3 years of being cancer free, but usually they wait for 5 years.  Regarding endobronchial valves, we do not have a program often going  for routine use of endobronchial valves for COPD yet.  We are hoping to this happen in the near future.  I will have his case reviewed this restart during these cases more regularly here.      Has already received the flu vaccine.     Sebastian Busch MD          Today's visit note:     Chief Complaint: John Yanes is a 68 year old year old male who is being seen for No chief complaint on file.      HISTORY OF PRESENT ILLNESS:    This is a 69-year-old male with a history of COPD and bladder cancer who is presenting for pulmonary follow-up.    Is still very symptomatic from a respiratory standpoint.  He has daily cough with sputum production, and significant shortness of breath.  In addition he has had a lot of skeletal problems related to osteoporosis.  He had another recent vertebral fracture, for which he has been in a brace.  He has developed some sciatic pain in his right leg.    Unfortunately, due to some of his back pain and leg pain he has not been walking as much as he used to.  He used to go to [facility] and walk along the track using oxygen.    Is currently on Pulmicort, Brovana, Incruse Ellipta, and using his albuterol inhaler 2-3 times a day.  He is coughing up clear phlegm, and thinks it spread around a tablespoon or maybe more every day.  He is using oxygen at home with exertion, and can walk up a flight of steps without significant problems.  Over the last year, he has required about 2-3 rounds of prednisone.  But 2 months ago he had an antibiotic for urinary tract infection, but he has not required any antibiotics for lung issues.  He is on chronic azithromycin.    He thinks he has been gaining a little bit weight in his abdomen.         Past Medical and Surgical History:     Past Medical History:   Diagnosis Date     Bladder cancer (H)      Chronic pain      COPD (chronic obstructive pulmonary disease) (H)      Depression      GERD (gastroesophageal reflux disease)      High cholesterol       Hypertension      Osteoporosis 2016     Vertebral fracture, osteoporotic (H) 2016    T5     Past Surgical History:   Procedure Laterality Date     AS ESOPHAGOSCOPY, DIAGNOSTIC       AS KNEE SCOPE, DIAGNOSTIC       CYSTOSCOPY       CYSTOSCOPY, TRANSURETHRAL RESECTION (TUR) TUMOR BLADDER, COMBINED       RELEASE CARPAL TUNNEL             Family History:     Family History   Problem Relation Age of Onset     Dementia Mother      Liver Cancer Father      Heart Failure Brother               Social History:     Social History     Socioeconomic History     Marital status:      Spouse name: Not on file     Number of children: Not on file     Years of education: Not on file     Highest education level: Not on file   Occupational History     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     Occupation:      Occupation:    Social Needs     Financial resource strain: Not on file     Food insecurity:     Worry: Not on file     Inability: Not on file     Transportation needs:     Medical: Not on file     Non-medical: Not on file   Tobacco Use     Smoking status: Former Smoker     Packs/day: 2.00     Years: 35.00     Pack years: 70.00     Types: Cigarettes     Last attempt to quit: 2001     Years since quittin.8     Smokeless tobacco: Never Used   Substance and Sexual Activity     Alcohol use: Not on file     Drug use: Yes     Types: Marijuana     Comment: medical use for pain (takes pills)     Sexual activity: Not on file   Lifestyle     Physical activity:     Days per week: Not on file     Minutes per session: Not on file     Stress: Not on file   Relationships     Social connections:     Talks on phone: Not on file     Gets together: Not on file     Attends Christianity service: Not on file     Active member of club or organization: Not on file     Attends meetings of clubs or organizations: Not on file     Relationship status: Not on file      Intimate partner violence:     Fear of current or ex partner: Not on file     Emotionally abused: Not on file     Physically abused: Not on file     Forced sexual activity: Not on file   Other Topics Concern     Parent/sibling w/ CABG, MI or angioplasty before 65F 55M? Not Asked   Social History Narrative     Not on file            Medications:     Current Outpatient Medications   Medication     albuterol (VENTOLIN HFA) 108 (90 Base) MCG/ACT inhaler     alendronate (FOSAMAX) 70 MG tablet     arformoterol (BROVANA) 15 MCG/2ML NEBU neb solution     azithromycin (ZITHROMAX) 250 MG tablet     budesonide (PULMICORT) 0.5 MG/2ML neb solution     buPROPion (WELLBUTRIN XL) 300 MG 24 hr tablet     levalbuterol (XOPENEX) 1.25 MG/3ML neb solution     medical cannabis (Patient's own supply.  Not a prescription)     omeprazole (PRILOSEC) 40 MG capsule     order for DME     order for DME     tamsulosin (FLOMAX) 0.4 MG capsule     umeclidinium (INCRUSE ELLIPTA) 62.5 MCG/INH inhaler     No current facility-administered medications for this visit.             Review of Systems:       Answers for HPI/ROS submitted by the patient on 10/7/2019   General Symptoms: Yes  Skin Symptoms: Yes  HENT Symptoms: No  EYE SYMPTOMS: Yes  HEART SYMPTOMS: No  LUNG SYMPTOMS: Yes  INTESTINAL SYMPTOMS: Yes  URINARY SYMPTOMS: Yes  REPRODUCTIVE SYMPTOMS: No  SKELETAL SYMPTOMS: Yes  BLOOD SYMPTOMS: Yes  NERVOUS SYSTEM SYMPTOMS: No  MENTAL HEALTH SYMPTOMS: No  Fever: No  Loss of appetite: No  Weight loss: No  Weight gain: Yes  Fatigue: Yes  Night sweats: No  Chills: No  Increased stress: No  Excessive hunger: No  Excessive thirst: No  Feeling hot or cold when others believe the temperature is normal: No  Loss of height: No  Post-operative complications: No  Surgical site pain: No  Hallucinations: No  Change in or Loss of Energy: No  Hyperactivity: No  Confusion: No  Changes in hair: No  Changes in moles/birth marks: No  Itching: No  Rashes: No  Changes in  "nails: No  Acne: No  Change in facial hair: No  Warts: No  Non-healing sores: Yes  Scarring: No  Flaking of skin: No  Color changes of hands/feet in cold : No  Sun sensitivity: No  Skin thickening: No  Eye pain: No  Vision loss: Yes  Dry eyes: No  Watery eyes: No  Eye bulging: No  Double vision: No  Flashing of lights: No  Spots: Yes  Floaters: Yes  Redness: No  Crossed eyes: No  Tunnel Vision: No  Yellowing of eyes: No  Eye irritation: No  Cough: Yes  Sputum or phlegm: Yes  Coughing up blood: No  Difficulty breating or shortness of breath: Yes  Snoring: No  Wheezing: Yes  Difficulty breathing on exertion: Yes  Nighttime Cough: Yes  Difficulty breathing when lying flat: Yes  Heart burn or indigestion: Yes  Nausea: No  Vomiting: No  Abdominal pain: No  Bloating: No  Constipation: No  Diarrhea: No  Blood in stool: No  Black stools: No  Rectal or Anal pain: No  Fecal incontinence: No  Yellowing of skin or eyes: No  Vomit with blood: No  Change in stools: No  Trouble holding urine or incontinence: Yes  Pain or burning: No  Trouble starting or stopping: No  Increased frequency of urination: No  Blood in urine: No  Decreased frequency of urination: No  Frequent nighttime urination: Yes  Flank pain: No  Difficulty emptying bladder: Yes  Back pain: Yes  Muscle aches: No  Neck pain: Yes  Swollen joints: No  Joint pain: Yes  Bone pain: No  Muscle cramps: No  Muscle weakness: No  Joint stiffness: No  Bone fracture: Yes  Anemia: No  Swollen glands: No  Easy bleeding or bruising: No  Edema or swelling: No          PHYSICAL EXAM:  /76   Pulse 90   Ht 1.778 m (5' 10\")   Wt 78.3 kg (172 lb 11.2 oz)   SpO2 92%   BMI 24.78 kg/m         General:  No apparent distress  Eyes: Anicteric  Ears: Hearing grossly normal  Mouth: Oral mucosa is moist, without any lesions. No oropharyngeal exudate.  Neck: supple, no thyromegaly  Lymphatics: No cervical or supraclavicular nodes  Respiratory: Very distant breath sounds.  Expiratory " wheezing in upper lobes.  Cardiac: RRR, normal S1, S2. No murmurs.  Abdomen: Soft, NT/ND  Musculoskeletal: Extremities normal. No clubbing. No cyanosis. No edema.  Skin: No rash on limited exam  Neuro: Normal mentation. Normal speech.  Psych:Normal affect           Data:   All laboratory and imaging data reviewed.      Chest CT scan: February 2018  1. Multiple bilateral pulmonary nodules including a suspicious  spiculated 10 mm nodule in the left upper lobe, consider PET/CT and/or  tissue sampling for further evaluation.  2. 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  3. Diffuse bony demineralization with multiple age indeterminate  presumably pathologic impression fractures of the thoracolumbar spine.      Again, thank you for allowing me to participate in the care of your patient.      Sincerely,    Holden Busch MD

## 2019-10-21 NOTE — NURSING NOTE
Chief Complaint   Patient presents with     Follow Up     copd     Vitals were taken and medications were reconciled.     NAYELI Hurt

## 2019-10-21 NOTE — PROGRESS NOTES
[facility]  Pulmonary Medicine  Visit Clinic Note  October 21, 2019         ASSESSMENT & PLAN       Very severe COPD: Currently still symptomatic from his breathlessness standpoint as well as bronchitis.  He is on nebulized Brovana, Pulmicort, and inhaled Incruse Ellipta.  He is on daily azithromycin and as needed albuterol.    Unfortunately, he has not been exercising as much as he used to.  I encouraged him to get back into this, just because he needs to keep his muscles of his condition is possible.  For his bronchitis and sputum production, I have asked him to give me a sputum specimen.  I will get a look for Gram stain, aerobic culture, fungal culture, AFB stain and culture.    He is symptomatic despite being on an ICS, LABA, LAMA.  He is using Incruse Ellipta.  It is possible that he is not getting all of this medication into his lungs due to a low inspiratory capacity.  There is a nebulized long acting muscarinic antagonist, glycopyrrolate.  I will prescribe this medication for his.  There may be of prior authorization process.  Essentially, that his current inhaler regimen is not working, I do believe that he needs all nebulized therapies due to his significant gas trapping and low inspiratory capacity.    We discussed the idea of endobronchial valves and lung transplantation again.  Given his recent bladder cancer diagnosis, this does put on hold a referral for lung transplant.  My discussions with the transplant team, they may consider discussing his case after 3 years of being cancer free, but usually they wait for 5 years.  Regarding endobronchial valves, we do not have a program often going for routine use of endobronchial valves for COPD yet.  We are hoping to this happen in the near future.  I will have his case reviewed this restart during these cases more regularly here.      Has already received the flu vaccine.     Sebastian Busch MD          Today's visit note:     Chief  Complaint: John Yanes is a 68 year old year old male who is being seen for No chief complaint on file.      HISTORY OF PRESENT ILLNESS:    This is a 69-year-old male with a history of COPD and bladder cancer who is presenting for pulmonary follow-up.    Is still very symptomatic from a respiratory standpoint.  He has daily cough with sputum production, and significant shortness of breath.  In addition he has had a lot of skeletal problems related to osteoporosis.  He had another recent vertebral fracture, for which he has been in a brace.  He has developed some sciatic pain in his right leg.    Unfortunately, due to some of his back pain and leg pain he has not been walking as much as he used to.  He used to go to [facility] and walk along the track using oxygen.    Is currently on Pulmicort, Brovana, Incruse Ellipta, and using his albuterol inhaler 2-3 times a day.  He is coughing up clear phlegm, and thinks it spread around a tablespoon or maybe more every day.  He is using oxygen at home with exertion, and can walk up a flight of steps without significant problems.  Over the last year, he has required about 2-3 rounds of prednisone.  But 2 months ago he had an antibiotic for urinary tract infection, but he has not required any antibiotics for lung issues.  He is on chronic azithromycin.    He thinks he has been gaining a little bit weight in his abdomen.         Past Medical and Surgical History:     Past Medical History:   Diagnosis Date     Bladder cancer (H)      Chronic pain      COPD (chronic obstructive pulmonary disease) (H)      Depression      GERD (gastroesophageal reflux disease)      High cholesterol      Hypertension      Osteoporosis 06/2016     Vertebral fracture, osteoporotic (H) 06/2016    T5     Past Surgical History:   Procedure Laterality Date     AS ESOPHAGOSCOPY, DIAGNOSTIC       AS KNEE SCOPE, DIAGNOSTIC       CYSTOSCOPY       CYSTOSCOPY, TRANSURETHRAL RESECTION (TUR) TUMOR BLADDER,  COMBINED       RELEASE CARPAL TUNNEL             Family History:     Family History   Problem Relation Age of Onset     Dementia Mother      Liver Cancer Father      Heart Failure Brother               Social History:     Social History     Socioeconomic History     Marital status:      Spouse name: Not on file     Number of children: Not on file     Years of education: Not on file     Highest education level: Not on file   Occupational History     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     Occupation:      Occupation:    Social Needs     Financial resource strain: Not on file     Food insecurity:     Worry: Not on file     Inability: Not on file     Transportation needs:     Medical: Not on file     Non-medical: Not on file   Tobacco Use     Smoking status: Former Smoker     Packs/day: 2.00     Years: 35.00     Pack years: 70.00     Types: Cigarettes     Last attempt to quit: 2001     Years since quittin.8     Smokeless tobacco: Never Used   Substance and Sexual Activity     Alcohol use: Not on file     Drug use: Yes     Types: Marijuana     Comment: medical use for pain (takes pills)     Sexual activity: Not on file   Lifestyle     Physical activity:     Days per week: Not on file     Minutes per session: Not on file     Stress: Not on file   Relationships     Social connections:     Talks on phone: Not on file     Gets together: Not on file     Attends Yazidi service: Not on file     Active member of club or organization: Not on file     Attends meetings of clubs or organizations: Not on file     Relationship status: Not on file     Intimate partner violence:     Fear of current or ex partner: Not on file     Emotionally abused: Not on file     Physically abused: Not on file     Forced sexual activity: Not on file   Other Topics Concern     Parent/sibling w/ CABG, MI or angioplasty before 65F 55M? Not Asked   Social History  Narrative     Not on file            Medications:     Current Outpatient Medications   Medication     albuterol (VENTOLIN HFA) 108 (90 Base) MCG/ACT inhaler     alendronate (FOSAMAX) 70 MG tablet     arformoterol (BROVANA) 15 MCG/2ML NEBU neb solution     azithromycin (ZITHROMAX) 250 MG tablet     budesonide (PULMICORT) 0.5 MG/2ML neb solution     buPROPion (WELLBUTRIN XL) 300 MG 24 hr tablet     levalbuterol (XOPENEX) 1.25 MG/3ML neb solution     medical cannabis (Patient's own supply.  Not a prescription)     omeprazole (PRILOSEC) 40 MG capsule     order for DME     order for DME     tamsulosin (FLOMAX) 0.4 MG capsule     umeclidinium (INCRUSE ELLIPTA) 62.5 MCG/INH inhaler     No current facility-administered medications for this visit.             Review of Systems:       Answers for HPI/ROS submitted by the patient on 10/7/2019   General Symptoms: Yes  Skin Symptoms: Yes  HENT Symptoms: No  EYE SYMPTOMS: Yes  HEART SYMPTOMS: No  LUNG SYMPTOMS: Yes  INTESTINAL SYMPTOMS: Yes  URINARY SYMPTOMS: Yes  REPRODUCTIVE SYMPTOMS: No  SKELETAL SYMPTOMS: Yes  BLOOD SYMPTOMS: Yes  NERVOUS SYSTEM SYMPTOMS: No  MENTAL HEALTH SYMPTOMS: No  Fever: No  Loss of appetite: No  Weight loss: No  Weight gain: Yes  Fatigue: Yes  Night sweats: No  Chills: No  Increased stress: No  Excessive hunger: No  Excessive thirst: No  Feeling hot or cold when others believe the temperature is normal: No  Loss of height: No  Post-operative complications: No  Surgical site pain: No  Hallucinations: No  Change in or Loss of Energy: No  Hyperactivity: No  Confusion: No  Changes in hair: No  Changes in moles/birth marks: No  Itching: No  Rashes: No  Changes in nails: No  Acne: No  Change in facial hair: No  Warts: No  Non-healing sores: Yes  Scarring: No  Flaking of skin: No  Color changes of hands/feet in cold : No  Sun sensitivity: No  Skin thickening: No  Eye pain: No  Vision loss: Yes  Dry eyes: No  Watery eyes: No  Eye bulging: No  Double vision:  "No  Flashing of lights: No  Spots: Yes  Floaters: Yes  Redness: No  Crossed eyes: No  Tunnel Vision: No  Yellowing of eyes: No  Eye irritation: No  Cough: Yes  Sputum or phlegm: Yes  Coughing up blood: No  Difficulty breating or shortness of breath: Yes  Snoring: No  Wheezing: Yes  Difficulty breathing on exertion: Yes  Nighttime Cough: Yes  Difficulty breathing when lying flat: Yes  Heart burn or indigestion: Yes  Nausea: No  Vomiting: No  Abdominal pain: No  Bloating: No  Constipation: No  Diarrhea: No  Blood in stool: No  Black stools: No  Rectal or Anal pain: No  Fecal incontinence: No  Yellowing of skin or eyes: No  Vomit with blood: No  Change in stools: No  Trouble holding urine or incontinence: Yes  Pain or burning: No  Trouble starting or stopping: No  Increased frequency of urination: No  Blood in urine: No  Decreased frequency of urination: No  Frequent nighttime urination: Yes  Flank pain: No  Difficulty emptying bladder: Yes  Back pain: Yes  Muscle aches: No  Neck pain: Yes  Swollen joints: No  Joint pain: Yes  Bone pain: No  Muscle cramps: No  Muscle weakness: No  Joint stiffness: No  Bone fracture: Yes  Anemia: No  Swollen glands: No  Easy bleeding or bruising: No  Edema or swelling: No          PHYSICAL EXAM:  /76   Pulse 90   Ht 1.778 m (5' 10\")   Wt 78.3 kg (172 lb 11.2 oz)   SpO2 92%   BMI 24.78 kg/m         General:  No apparent distress  Eyes: Anicteric  Ears: Hearing grossly normal  Mouth: Oral mucosa is moist, without any lesions. No oropharyngeal exudate.  Neck: supple, no thyromegaly  Lymphatics: No cervical or supraclavicular nodes  Respiratory: Very distant breath sounds.  Expiratory wheezing in upper lobes.  Cardiac: RRR, normal S1, S2. No murmurs.  Abdomen: Soft, NT/ND  Musculoskeletal: Extremities normal. No clubbing. No cyanosis. No edema.  Skin: No rash on limited exam  Neuro: Normal mentation. Normal speech.  Psych:Normal affect           Data:   All laboratory and imaging " data reviewed.      Chest CT scan: February 2018  1. Multiple bilateral pulmonary nodules including a suspicious  spiculated 10 mm nodule in the left upper lobe, consider PET/CT and/or  tissue sampling for further evaluation.  2. 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  3. Diffuse bony demineralization with multiple age indeterminate  presumably pathologic impression fractures of the thoracolumbar spine.

## 2019-10-22 LAB
FUNGUS SPEC CULT: ABNORMAL
FUNGUS SPEC CULT: ABNORMAL
SPECIMEN SOURCE: ABNORMAL

## 2019-10-24 LAB
ACID FAST STN SPEC QL: NORMAL
SPECIMEN SOURCE: NORMAL

## 2019-11-20 ENCOUNTER — TELEPHONE (OUTPATIENT)
Dept: PULMONOLOGY | Facility: CLINIC | Age: 69
End: 2019-11-20

## 2019-11-20 NOTE — TELEPHONE ENCOUNTER
CENTRAL PRIOR AUTHORIZATION  [phone]    PA Initiation    Medication:   Insurance Company: [employer] RX - Phone [phone] Fax [phone]  Pharmacy Filling the Rx: [id number] - [place]  [address] [facility]  Filling Pharmacy Phone: [phone]  Filling Pharmacy Fax:    Start Date: 11/20/2019

## 2019-11-20 NOTE — TELEPHONE ENCOUNTER
Prior Authorization Retail Medication Request    Medication/Dose: glycopyrrolate  ICD code (if different than what is on RX):    Previously Tried and Failed:    Rationale:      Insurance Name:    Insurance ID:        Pharmacy Information (if different than what is on RX)  Name:    Phone:

## 2019-11-21 NOTE — TELEPHONE ENCOUNTER
Prior Authorization Approval    Authorization Effective Date: 8/22/2019  Authorization Expiration Date: 11/19/2020  Medication: roseann rivera starter kit - Approved  Approved Dose/Quantity:   Reference #:     Insurance Company: Medicare [employer] RX - Phone [phone] Fax [phone]  Expected CoPay:       CoPay Card Available: No    Foundation Assistance Needed:    Which Pharmacy is filling the prescription (Not needed for infusion/clinic administered): [id number] - [place] - [address]  Pharmacy Notified: Yes  Patient Notified: Yes

## 2019-11-22 ENCOUNTER — MYC MEDICAL ADVICE (OUTPATIENT)
Dept: PULMONOLOGY | Facility: CLINIC | Age: 69
End: 2019-11-22

## 2019-12-03 NOTE — TELEPHONE ENCOUNTER
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      Will wait until the mycobacterium grows more and can be identified, and then will also test susceptibilities    Sebastian Busch MD    
WDL

## 2019-12-27 LAB
MYCOBACTERIUM SPEC CULT: ABNORMAL
SPECIMEN SOURCE: ABNORMAL

## 2019-12-31 ENCOUNTER — TELEPHONE (OUTPATIENT)
Dept: PULMONOLOGY | Facility: CLINIC | Age: 69
End: 2019-12-31

## 2019-12-31 DIAGNOSIS — A31.8 MYCOBACTERIUM GORDONAE INFECTION: Primary | ICD-10-CM

## 2019-12-31 NOTE — TELEPHONE ENCOUNTER
[facility] Call Center    Phone Message    May a detailed message be left on voicemail: yes    Reason for Call: Other: Pt said he's returning a call he got from Dr. Busch yesterday. Possibly about some type of results. I do not see a note about an outgoing call yesterday. Please return pt's phone call.      Action Taken: Message routed to:  [facility] ([facility]): Pulmonary

## 2020-01-08 ENCOUNTER — CARE COORDINATION (OUTPATIENT)
Dept: PULMONOLOGY | Facility: CLINIC | Age: 70
End: 2020-01-08

## 2020-01-08 NOTE — PROGRESS NOTES
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   This is extremely rare to cause any pulmonary disease.  I think it is more likely a contaminant.  However, I would like to get another AFB stain and sputum from him to be sure.  If it grows again, I will likely refer him to ID. He should remain off the azithromycin for now.     Contacted pt with Dr. Busch's message above.  Pt verbalized understanding and agreement and has no further questions at this time

## 2020-01-09 ENCOUNTER — MYC MEDICAL ADVICE (OUTPATIENT)
Dept: PULMONOLOGY | Facility: CLINIC | Age: 70
End: 2020-01-09

## 2020-01-09 NOTE — TELEPHONE ENCOUNTER
Pt called stating they did not have orders for labs. RN called and spoke with Matt Gaona who verified the orders were there. Advised patient to go for sputum and gave number for appointment.   Marylin Felipe RN BSN

## 2020-01-10 DIAGNOSIS — A31.8 MYCOBACTERIUM GORDONAE INFECTION: ICD-10-CM

## 2020-01-10 PROCEDURE — 99000 SPECIMEN HANDLING OFFICE-LAB: CPT

## 2020-01-10 PROCEDURE — 87206 SMEAR FLUORESCENT/ACID STAI: CPT | Mod: 90

## 2020-01-10 PROCEDURE — 87116 MYCOBACTERIA CULTURE: CPT | Mod: 90

## 2020-01-13 LAB
ACID FAST STN SPEC QL: NORMAL
SPECIMEN SOURCE: NORMAL

## 2020-01-27 ENCOUNTER — TELEPHONE (OUTPATIENT)
Dept: PULMONOLOGY | Facility: CLINIC | Age: 70
End: 2020-01-27

## 2020-01-27 NOTE — TELEPHONE ENCOUNTER
Contacted by [facility] pharmacy as pt is requesting 3 mth supply of Glycopyrrolate in order to reduce copay.  Verbal order to dispense 90 day supply with 2 refills given to pharmacist.

## 2020-02-20 ENCOUNTER — TELEPHONE (OUTPATIENT)
Dept: PULMONOLOGY | Facility: CLINIC | Age: 70
End: 2020-02-20

## 2020-02-20 DIAGNOSIS — A31.8 MYCOBACTERIUM GORDONAE INFECTION: Primary | ICD-10-CM

## 2020-02-20 NOTE — TELEPHONE ENCOUNTER
RECORDS RECEIVED FROM: Internal - Mycobacterium Gordonae of the lungs // per [facility] in Pulm // Refd by Dr Busch // no outside recs   DATE RECEIVED: 03.03.2020   NOTES (Gather within 2 years) STATUS DETAILS   OFFICE NOTE from referring provider   Internal 02.20.2020 Dr. Hernandez   OFFICE NOTE from other specialist N/A    DISCHARGE SUMMARY from hospital N/A    DISCHARGE REPORT from the ER N/A    LABS (any labs) Internal / CE    MEDICATION LIST Internal / CE    IMAGING  (NEED IMAGES AND REPORTS)     Osteomyelitis: Foot imaging  N/A    Liver Abscess: Abdominal imaging N/A    Other (anything related to diagnoses N/A

## 2020-02-20 NOTE — TELEPHONE ENCOUNTER
I called Mr Farzana to discuss his recent sputum results.     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    He is still having significant respiratory symptoms on a daily basis. Dyspnea, cough, sputum.     I suspect the majority of these are related to his underlying emphysema.  I told him that treating this mycobacterium could have a benefit, but it's tough to say how much.  There are also potential consequences of antibiotic therapy, this would likely be prolonged therapy.      He tells me at this point, he is willing to try anything.      I think the best step is an infectious disease consult to discuss what treatment would entail.      Sebastian Busch MD

## 2020-02-24 ASSESSMENT — ENCOUNTER SYMPTOMS
NECK PAIN: 1
PANIC: 0
EYE PAIN: 0
EYE REDNESS: 0
SINUS CONGESTION: 1
NECK MASS: 0
POSTURAL DYSPNEA: 1
WEIGHT GAIN: 1
ARTHRALGIAS: 0
SPUTUM PRODUCTION: 1
HOARSE VOICE: 0
DOUBLE VISION: 0
EYE IRRITATION: 0
DEPRESSION: 0
NIGHT SWEATS: 0
WHEEZING: 1
JOINT SWELLING: 0
STIFFNESS: 0
SHORTNESS OF BREATH: 1
SNORES LOUDLY: 0
FLANK PAIN: 0
HEMATURIA: 0
COUGH: 1
SORE THROAT: 0
TASTE DISTURBANCE: 0
MUSCLE WEAKNESS: 1
MUSCLE CRAMPS: 0
COUGH DISTURBING SLEEP: 1
DECREASED CONCENTRATION: 0
CHILLS: 1
INSOMNIA: 0
POLYDIPSIA: 0
DECREASED APPETITE: 0
INCREASED ENERGY: 1
HALLUCINATIONS: 0
BACK PAIN: 1
NERVOUS/ANXIOUS: 1
TROUBLE SWALLOWING: 0
WEIGHT LOSS: 0
DYSURIA: 0
FEVER: 0
EYE WATERING: 0
SMELL DISTURBANCE: 0
MYALGIAS: 1
SINUS PAIN: 0
HEMOPTYSIS: 0
ALTERED TEMPERATURE REGULATION: 0
DYSPNEA ON EXERTION: 1
POLYPHAGIA: 1
DIFFICULTY URINATING: 1
FATIGUE: 1

## 2020-03-03 ENCOUNTER — HOSPITAL ENCOUNTER (INPATIENT)
Dept: GENERAL RADIOLOGY | Facility: CLINIC | Age: 70
End: 2020-03-03
Attending: INTERNAL MEDICINE
Payer: MEDICARE

## 2020-03-03 ENCOUNTER — TELEPHONE (OUTPATIENT)
Dept: INFECTIOUS DISEASES | Facility: CLINIC | Age: 70
End: 2020-03-03

## 2020-03-03 ENCOUNTER — PRE VISIT (OUTPATIENT)
Dept: INFECTIOUS DISEASES | Facility: CLINIC | Age: 70
End: 2020-03-03

## 2020-03-03 PROCEDURE — 40000135 CT OUTSIDE READ

## 2020-03-03 NOTE — TELEPHONE ENCOUNTER
Patient contacted and reminded of upcoming appointment.  Patient confirmed they will be attending.  Patient instructed to bring updated medications list to appointment.    Bal Cuevas MA

## 2020-03-04 ENCOUNTER — ANCILLARY PROCEDURE (OUTPATIENT)
Dept: CT IMAGING | Facility: CLINIC | Age: 70
End: 2020-03-04
Attending: INTERNAL MEDICINE
Payer: MEDICARE

## 2020-03-04 ENCOUNTER — OFFICE VISIT (OUTPATIENT)
Dept: INFECTIOUS DISEASES | Facility: CLINIC | Age: 70
End: 2020-03-04
Payer: MEDICARE

## 2020-03-04 VITALS
BODY MASS INDEX: 25.28 KG/M2 | TEMPERATURE: 98.1 F | DIASTOLIC BLOOD PRESSURE: 85 MMHG | WEIGHT: 176.2 LBS | HEART RATE: 88 BPM | OXYGEN SATURATION: 91 % | SYSTOLIC BLOOD PRESSURE: 136 MMHG

## 2020-03-04 DIAGNOSIS — R05.9 COUGH: ICD-10-CM

## 2020-03-04 DIAGNOSIS — A31.9 MYCOBACTERIUM, ATYPICAL: ICD-10-CM

## 2020-03-04 DIAGNOSIS — R05.9 COUGH: Primary | ICD-10-CM

## 2020-03-04 LAB
ALBUMIN SERPL-MCNC: 4.1 G/DL (ref 3.4–5)
ALP SERPL-CCNC: 57 U/L (ref 40–150)
ALT SERPL W P-5'-P-CCNC: 46 U/L (ref 0–70)
ANION GAP SERPL CALCULATED.3IONS-SCNC: 4 MMOL/L (ref 3–14)
AST SERPL W P-5'-P-CCNC: 31 U/L (ref 0–45)
BASOPHILS # BLD AUTO: 0 10E9/L (ref 0–0.2)
BASOPHILS NFR BLD AUTO: 0.6 %
BILIRUB SERPL-MCNC: 0.6 MG/DL (ref 0.2–1.3)
BUN SERPL-MCNC: 14 MG/DL (ref 7–30)
CALCIUM SERPL-MCNC: 9.6 MG/DL (ref 8.5–10.1)
CHLORIDE SERPL-SCNC: 104 MMOL/L (ref 94–109)
CO2 SERPL-SCNC: 28 MMOL/L (ref 20–32)
CREAT SERPL-MCNC: 1 MG/DL (ref 0.66–1.25)
CRP SERPL-MCNC: <2.9 MG/L (ref 0–8)
DIFFERENTIAL METHOD BLD: NORMAL
EOSINOPHIL # BLD AUTO: 0.2 10E9/L (ref 0–0.7)
EOSINOPHIL NFR BLD AUTO: 3.1 %
ERYTHROCYTE [DISTWIDTH] IN BLOOD BY AUTOMATED COUNT: 12.6 % (ref 10–15)
ERYTHROCYTE [SEDIMENTATION RATE] IN BLOOD BY WESTERGREN METHOD: 10 MM/H (ref 0–20)
GFR SERPL CREATININE-BSD FRML MDRD: 76 ML/MIN/{1.73_M2}
GLUCOSE SERPL-MCNC: 104 MG/DL (ref 70–99)
HCT VFR BLD AUTO: 47.2 % (ref 40–53)
HGB BLD-MCNC: 15.1 G/DL (ref 13.3–17.7)
IMM GRANULOCYTES # BLD: 0 10E9/L (ref 0–0.4)
IMM GRANULOCYTES NFR BLD: 0.2 %
LYMPHOCYTES # BLD AUTO: 1.2 10E9/L (ref 0.8–5.3)
LYMPHOCYTES NFR BLD AUTO: 22.7 %
MCH RBC QN AUTO: 30.8 PG (ref 26.5–33)
MCHC RBC AUTO-ENTMCNC: 32 G/DL (ref 31.5–36.5)
MCV RBC AUTO: 96 FL (ref 78–100)
MONOCYTES # BLD AUTO: 0.5 10E9/L (ref 0–1.3)
MONOCYTES NFR BLD AUTO: 9.4 %
NEUTROPHILS # BLD AUTO: 3.4 10E9/L (ref 1.6–8.3)
NEUTROPHILS NFR BLD AUTO: 64 %
NRBC # BLD AUTO: 0 10*3/UL
NRBC BLD AUTO-RTO: 0 /100
PLATELET # BLD AUTO: 235 10E9/L (ref 150–450)
POTASSIUM SERPL-SCNC: 4.2 MMOL/L (ref 3.4–5.3)
PROT SERPL-MCNC: 7.9 G/DL (ref 6.8–8.8)
RBC # BLD AUTO: 4.91 10E12/L (ref 4.4–5.9)
SODIUM SERPL-SCNC: 136 MMOL/L (ref 133–144)
WBC # BLD AUTO: 5.2 10E9/L (ref 4–11)

## 2020-03-04 PROCEDURE — G0463 HOSPITAL OUTPT CLINIC VISIT: HCPCS | Mod: ZF

## 2020-03-04 PROCEDURE — 85025 COMPLETE CBC W/AUTO DIFF WBC: CPT | Performed by: INTERNAL MEDICINE

## 2020-03-04 PROCEDURE — 87389 HIV-1 AG W/HIV-1&-2 AB AG IA: CPT | Performed by: INTERNAL MEDICINE

## 2020-03-04 PROCEDURE — 80053 COMPREHEN METABOLIC PANEL: CPT | Performed by: INTERNAL MEDICINE

## 2020-03-04 PROCEDURE — 36415 COLL VENOUS BLD VENIPUNCTURE: CPT | Performed by: INTERNAL MEDICINE

## 2020-03-04 PROCEDURE — 86140 C-REACTIVE PROTEIN: CPT | Performed by: INTERNAL MEDICINE

## 2020-03-04 PROCEDURE — 85652 RBC SED RATE AUTOMATED: CPT | Performed by: INTERNAL MEDICINE

## 2020-03-04 ASSESSMENT — PAIN SCALES - GENERAL: PAINLEVEL: NO PAIN (0)

## 2020-03-04 NOTE — NURSING NOTE
Chief Complaint   Patient presents with     Consult     Myobacterium Gordonsammie of the lungs         /85 (BP Location: Right arm, Patient Position: Sitting, Cuff Size: Adult Regular)   Pulse 88   Temp 98.1  F (36.7  C) (Oral)   Wt 79.9 kg (176 lb 3.2 oz)   SpO2 91%   BMI 25.28 kg/m          Didi Collins CMA    3/4/2020 8:52 AM

## 2020-03-04 NOTE — LETTER
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 John Yanes is a 70 year-old male with PMHx significant for advanced/severe  COPD and chronic bronchitis (FEV1 28% and desaturation  when walking requiring O2 2L/min that he uses for about 10 hrs a day)  being managed by pulmonology (Dr. Busch)  on albuterol, levalbuterol,  inhaled budesonide, arformoterol  and umeclidium He was referred to our ID clinic because of growth of Mycobacterium gordonae in sputum culture from 10/21/19 in the setting of persistent chronic cough and SOB in spite of optimization of COPD treatment. He also had a new sputum sample sent to AFB culture on 1/10/20 and this is positive for AFB but identification is still in process. Of note the second sputum sample was sent to [facility] (phone: [phone]) and they sent the sample to [facility]. I called [facility] and they informed that Mycobacterium gordonae is growing from sputum from 1/10/20 (but still not official since it is not updated in the system yet).    His CT scans from 7860-3952 showed bilateral sub-centimeter lung nodules. Of note, a more recent  CT chest from 2/8/18 showed, in addition, a suspicious  spiculated 10 mm nodule in the left upper lobe. He had a PET scan performed in outside hospital ([facility]) which is in our PACS system although I was not able to find a formal reading for this test.    Mycobacterium gordonae is considered he least pathogenic of the NTMs and,  although there are some case reports in the literature, most of them are in immunocompromised patients. It is not clear to me if the respiratory symptoms are in fact  related to an infectious process or the underlying COPD. Previous CTs showed bilateral subcentimeter nodules that did not change over time except for a left upper lung spiculated 10 mm nodule on CT from 2/8/18. I do see that the patient has a PET scan from 8/2018 in PACS but I do not see the reading. It would be important to have an idea if the nodule is infections/malignant in nature so I requested our radiology team to read it. In addition I will obtain a new CT lung to see if there is any  progression.  I will also wait for the final result of the second sputum from 1/10/20. In addition, I did obtain basic labs today which showed normal CBC and CMP and normal CRP and ESR (I also added HIV testing for completeness)    For now, I will hold off on starting treatment. Once I have all results back, I will determine if treatment will be indicated. If so, I will need to reach out to [facility] to request susceptibility testing.    I will schedule a follow up visit in 2 weeks.      Recommendations discussed with the patient.      Heydi Saenz MD  Date of Service: 3/4/20             History of Present Illness:   Mr. John Yanes is a 70 year-old male with PMHx significant for advanced/severe  COPD and chronic bronchitis (FEV1 28% and desaturation  when walking requiring O2 2L/min that he uses for about 10 hrs a day)  being managed by pulmonology (Dr. Busch)  on albuterol, levalbuterol,  inhaled budesonide, arformoterol  and umeclidium He was referred to our ID clinic because of growth of Mycobacterium gordonae in sputum culture from 10/21/19 in the setting of persistent chronic cough and SOB in spite of optimization of COPD treatment.    Patient does state that over the last year he noted a decline is his respiratory capacity and symptoms (cough  and SOB).  Patient denies fever, chills, night sweats or weight loss. He also denies lymphadenopathy, skin lesions or GI symptoms.  He did smoke for about 40 years (2  packs a day) and quit in 2000. He also had exposure to inhaled chemicals in the past in his work environment.  He had several CT chests from 7485-3046 (in PACS) and they showed bilateral sub-centimeter pulmonary nodules. Of note, a more recent  CT chest from 2/8/18 showed in addition a suspicious  spiculated 10 mm nodule in the left upper lobe. He had a PET scan performed in outside hospital ([facility]) which is in our PACS system although I was not able to find a formal reading for this test.      In regards of the COPD/chronic bronchitis additional management options,  Dr. Busch considered  endobronchial valves and  lung transplantation option, but he was diagnosed with bladder cancer in 2016 and is s/p BCG injection s well as local radiation and chemotherapy in 2017. According to DR. Busch the relatively recent  bladder cancer diagnosis can  put on hold a referral for lung transplant and per  discussions with the transplant team, they may consider discussing his case after 3 years of being cancer free, but usually they wait for 5 years.    In regards of microbiology results, patient had a sputum AFB culture performed on 10/21/19 which became positive for Mycobacterium gordonae (test performed by [facility], susceptibility not performed because it is usually not recommended by the [facility] lab). He had a new sputum sample sent to AFB culture on 1/10/20 and this is positive for AFB but identification is still in process. Of note the second sputum sample was sent to [facility] lab (phone: [phone]) and they sent the sample to [facility].     I called [facility] and they informed that Mycobacterium gordonae is growing from sputum from 1/10/20 (but still not official since it is not updated in the system yet)             Review of Systems:     CONSTITUTIONAL:  No fevers or chills  INTEGUMENTARY/SKIN: NEGATIVE for worrisome rashes, moles or lesions  EYES: Negative for icterus, vision changes or  irritation  ENT/MOUTH:  Negative for oral lesions and sore throat  RESPIRATORY: Chronic cough and worsening SOB over the last year  CARDIOVASCULAR:  Negative for chest pain, palpitations and  shortness of breath  GASTROINTESTINAL:  Negative for abdominal pain, nausea, vomiting, diarrhea and constipation  GENITOURINARY:  Negative for dysuria, hematuria. Frequency related to his previous diagnosis os bladder cancer  MUSCULOSKELETAL: Negative for joint pain, swelling, motion restriction, negative for musculoskeletal pain  NEURO:  Negative for headache, altered mental status, numbness or weakness  PSYCHIATRIC: Negative for changes in mood or affect  HEMATOLOGIC/LYMPHATIC: negative for lymphadenopathy or bleeding  ALLERGIC/IMMUNOLOGIC: Negative for allergic reaction   ENDOCRINE: Negative for temperature intolerance, skin/hair changes         Past Medical History:     Past Medical History:   Diagnosis Date     Bladder cancer (H)      Chronic pain      COPD (chronic obstructive pulmonary disease) (H)      Depression      GERD (gastroesophageal reflux disease)      High cholesterol      Hypertension      Osteoporosis 06/2016     Vertebral fracture, osteoporotic (H) 06/2016    T5            Allergies:         Allergies   Allergen Reactions     Amoxicillin-Pot Clavulanate Unknown             Family History:     Family History   Problem Relation Age of Onset     Dementia Mother      Liver Cancer Father      Heart Failure Brother              Social History:     Social History     Socioeconomic History     Marital status:      Spouse name: Not on file     Number of children: Not on file     Years of education: Not on file     Highest education level: Not on file   Occupational History     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     Occupation:      Occupation:    Social Needs     Financial resource strain: Not on file     Food insecurity:     Worry: Not on  file     Inability: Not on file     Transportation needs:     Medical: Not on file     Non-medical: Not on file   Tobacco Use     Smoking status: Former Smoker     Packs/day: 2.00     Years: 35.00     Pack years: 70.00     Types: Cigarettes     Last attempt to quit: 2001     Years since quittin.1     Smokeless tobacco: Never Used   Substance and Sexual Activity     Alcohol use: Not on file     Drug use: Yes     Types: Marijuana     Comment: medical use for pain (takes pills)     Sexual activity: Not on file   Lifestyle     Physical activity:     Days per week: Not on file     Minutes per session: Not on file     Stress: Not on file   Relationships     Social connections:     Talks on phone: Not on file     Gets together: Not on file     Attends [employer]ze service: Not on file     Active member of club or organization: Not on file     Attends meetings of clubs or organizations: Not on file     Relationship status: Not on file     Intimate partner violence:     Fear of current or ex partner: Not on file     Emotionally abused: Not on file     Physically abused: Not on file     Forced sexual activity: Not on file   Other Topics Concern     Parent/sibling w/ CABG, MI or angioplasty before 65F 55M? Not Asked   Social History Narrative     Not on file        - smoked for almost 40 years (2 packs a day). Quit in   - Social drinking  - On medical marijuana program         Physical Exam:   /85 (BP Location: Right arm, Patient Position: Sitting, Cuff Size: Adult Regular)   Pulse 88   Temp 98.1  F (36.7  C) (Oral)   Wt 79.9 kg (176 lb 3.2 oz)   SpO2 91%   BMI 25.28 kg/m        Exam:  GENERAL:  Well-developed, well-nourished, not in acute distress.   HEAD: Normocephalic and atraumatic  ENT:  No hearing impairment, no ear pain or exudate, ear canals and TM's normal, nose and mouth without ulcers or lesions, oropharynx clear, oral mucous membranes moist, oropharynx is without exudates or ulcers.  EYES:   Eyes grossly normal to inspection, PERRL and conjunctivae and sclerae normal   NECK:  Supple, no adenopathy, no asymmetry, masses, or scars and thyroid normal to palpation  LUNGS:  Clear to auscultation - no rales, rhonchi or wheezes  CARDIOVASCULAR:  Regular rate and rhythm, normal S1 S2, no S3 or S4, no murmur, click or rub, no peripheral edema and peripheral pulses strong  ABDOMEN:  Soft, nontender, no hepatosplenomegaly, no masses and bowel sounds normal  EXT: Extremities warm and without edema.  MS: No gross musculoskeletal defects noted, no edema  SKIN:  No acute rashes or suspicious lesions  NEUROLOGIC:  Grossly nonfocal. Normal strength and tone, mentation intact and speech normal  PSYCHIATRIC: Mood stable, mentation appears normal, affect normal  HEMATOLOGIC/LYMPHATIC: No lymphadenopathy or bleeding    Heydi Saenz MD

## 2020-03-04 NOTE — PROGRESS NOTES
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 John Yanes is a 70 year-old male with PMHx significant for advanced/severe  COPD and chronic bronchitis (FEV1 28% and desaturation  when walking requiring O2 2L/min that he uses for about 10 hrs a day)  being managed by pulmonology (Dr. Busch)  on albuterol, levalbuterol,  inhaled budesonide, arformoterol  and umeclidium He was referred to our ID clinic because of growth of Mycobacterium gordonae in sputum culture from 10/21/19 in the setting of persistent chronic cough and SOB in spite of optimization of COPD treatment. He also had a new sputum sample sent to AFB culture on 1/10/20 and this is positive for AFB but identification is still in process. Of note the second sputum sample was sent to [facility] (phone: [phone]) and they sent the sample to [facility]. I called [facility] and they informed that Mycobacterium gordonae is growing from sputum from 1/10/20 (but still not official since it is not updated in the system yet).    His CT scans from 5184-2053 showed bilateral sub-centimeter lung nodules. Of note, a more recent  CT chest from 2/8/18 showed, in addition, a suspicious  spiculated 10 mm nodule in the left upper lobe. He had a PET scan performed in outside hospital ([facility]) which is in our PACS system although I was not able to find a formal reading for this test.    Mycobacterium gordonae is considered he least pathogenic of the NTMs and,  although there are some case reports in the literature, most of them are in immunocompromised patients. It is not clear to me if the respiratory symptoms are in fact related to an infectious process or the underlying COPD. Previous CTs showed  bilateral subcentimeter nodules that did not change over time except for a left upper lung spiculated 10 mm nodule on CT from 2/8/18. I do see that the patient has a PET scan from 8/2018 in PACS but I do not see the reading. It would be important to have an idea if the nodule is infections/malignant in nature so I requested our radiology team to read it. In addition I will obtain a new CT lung to see if there is any  progression.  I will also wait for the final result of the second sputum from 1/10/20. In addition, I did obtain basic labs today which showed normal CBC and CMP and normal CRP and ESR (I also added HIV testing for completeness)    For now, I will hold off on starting treatment. Once I have all results back, I will determine if treatment will be indicated. If so, I will need to reach out to [facility] to request susceptibility testing.    I will schedule a follow up visit in 2 weeks.      Recommendations discussed with the patient.      Heydi Saenz MD  Date of Service: 3/4/20             History of Present Illness:   Mr. John Yanes is a 70 year-old male with PMHx significant for advanced/severe  COPD and chronic bronchitis (FEV1 28% and desaturation  when walking requiring O2 2L/min that he uses for about 10 hrs a day)  being managed by pulmonology (Dr. Busch)  on albuterol, levalbuterol,  inhaled budesonide, arformoterol  and umeclidium He was referred to our ID clinic because of growth of Mycobacterium gordonae in sputum culture from 10/21/19 in the setting of persistent chronic cough and SOB in spite of optimization of COPD treatment.    Patient does state that over the last year he noted a decline is his respiratory capacity and symptoms (cough  and SOB).  Patient denies fever, chills, night sweats or weight loss. He also denies lymphadenopathy, skin lesions or GI symptoms.  He did smoke for about 40 years (2 packs a day) and quit in 2000. He also had exposure to inhaled chemicals in the  past in his work environment.  He had several CT chests from 7601-5704 (in PACS) and they showed bilateral sub-centimeter pulmonary nodules. Of note, a more recent  CT chest from 2/8/18 showed in addition a suspicious  spiculated 10 mm nodule in the left upper lobe. He had a PET scan performed in outside hospital ([facility]) which is in our PACS system although I was not able to find a formal reading for this test.      In regards of the COPD/chronic bronchitis additional management options,  Dr. Busch considered  endobronchial valves and  lung transplantation option, but he was diagnosed with bladder cancer in 2016 and is s/p BCG injection s well as local radiation and chemotherapy in 2017. According to DR. Busch the relatively recent  bladder cancer diagnosis can  put on hold a referral for lung transplant and per  discussions with the transplant team, they may consider discussing his case after 3 years of being cancer free, but usually they wait for 5 years.    In regards of microbiology results, patient had a sputum AFB culture performed on 10/21/19 which became positive for Mycobacterium gordonae (test performed by [facility] lab, susceptibility not performed because it is usually not recommended by the [facility] lab). He had a new sputum sample sent to AFB culture on 1/10/20 and this is positive for AFB but identification is still in process. Of note the second sputum sample was sent to [facility] (phone: [phone]) and they sent the sample to [facility].     I called [facility] and they informed that Mycobacterium gordonae is growing from sputum from 1/10/20 (but still not official since it is not updated in the system yet)             Review of Systems:     CONSTITUTIONAL:  No fevers or chills  INTEGUMENTARY/SKIN: NEGATIVE for worrisome rashes, moles or lesions  EYES: Negative for icterus, vision changes or irritation  ENT/MOUTH:  Negative for oral lesions and sore throat  RESPIRATORY: Chronic  cough and worsening SOB over the last year  CARDIOVASCULAR:  Negative for chest pain, palpitations and  shortness of breath  GASTROINTESTINAL:  Negative for abdominal pain, nausea, vomiting, diarrhea and constipation  GENITOURINARY:  Negative for dysuria, hematuria. Frequency related to his previous diagnosis os bladder cancer  MUSCULOSKELETAL: Negative for joint pain, swelling, motion restriction, negative for musculoskeletal pain  NEURO:  Negative for headache, altered mental status, numbness or weakness  PSYCHIATRIC: Negative for changes in mood or affect  HEMATOLOGIC/LYMPHATIC: negative for lymphadenopathy or bleeding  ALLERGIC/IMMUNOLOGIC: Negative for allergic reaction   ENDOCRINE: Negative for temperature intolerance, skin/hair changes         Past Medical History:     Past Medical History:   Diagnosis Date     Bladder cancer (H)      Chronic pain      COPD (chronic obstructive pulmonary disease) (H)      Depression      GERD (gastroesophageal reflux disease)      High cholesterol      Hypertension      Osteoporosis 06/2016     Vertebral fracture, osteoporotic (H) 06/2016    T5            Allergies:         Allergies   Allergen Reactions     Amoxicillin-Pot Clavulanate Unknown             Family History:     Family History   Problem Relation Age of Onset     Dementia Mother      Liver Cancer Father      Heart Failure Brother              Social History:     Social History     Socioeconomic History     Marital status:      Spouse name: Not on file     Number of children: Not on file     Years of education: Not on file     Highest education level: Not on file   Occupational History     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     Occupation:      Occupation:    Social Needs     Financial resource strain: Not on file     Food insecurity:     Worry: Not on file     Inability: Not on file     Transportation needs:     Medical: Not on file      Non-medical: Not on file   Tobacco Use     Smoking status: Former Smoker     Packs/day: 2.00     Years: 35.00     Pack years: 70.00     Types: Cigarettes     Last attempt to quit: 2001     Years since quittin.1     Smokeless tobacco: Never Used   Substance and Sexual Activity     Alcohol use: Not on file     Drug use: Yes     Types: Marijuana     Comment: medical use for pain (takes pills)     Sexual activity: Not on file   Lifestyle     Physical activity:     Days per week: Not on file     Minutes per session: Not on file     Stress: Not on file   Relationships     Social connections:     Talks on phone: Not on file     Gets together: Not on file     Attends [employer] service: Not on file     Active member of club or organization: Not on file     Attends meetings of clubs or organizations: Not on file     Relationship status: Not on file     Intimate partner violence:     Fear of current or ex partner: Not on file     Emotionally abused: Not on file     Physically abused: Not on file     Forced sexual activity: Not on file   Other Topics Concern     Parent/sibling w/ CABG, MI or angioplasty before 65F 55M? Not Asked   Social History Narrative     Not on file        - smoked for almost 40 years (2 packs a day). Quit in   - Social drinking  - On medical marijuana program         Physical Exam:   /85 (BP Location: Right arm, Patient Position: Sitting, Cuff Size: Adult Regular)   Pulse 88   Temp 98.1  F (36.7  C) (Oral)   Wt 79.9 kg (176 lb 3.2 oz)   SpO2 91%   BMI 25.28 kg/m       Exam:  GENERAL:  Well-developed, well-nourished, not in acute distress.   HEAD: Normocephalic and atraumatic  ENT:  No hearing impairment, no ear pain or exudate, ear canals and TM's normal, nose and mouth without ulcers or lesions, oropharynx clear, oral mucous membranes moist, oropharynx is without exudates or ulcers.  EYES:  Eyes grossly normal to inspection, PERRL and conjunctivae and sclerae normal   NECK:   Supple, no adenopathy, no asymmetry, masses, or scars and thyroid normal to palpation  LUNGS:  Clear to auscultation - no rales, rhonchi or wheezes  CARDIOVASCULAR:  Regular rate and rhythm, normal S1 S2, no S3 or S4, no murmur, click or rub, no peripheral edema and peripheral pulses strong  ABDOMEN:  Soft, nontender, no hepatosplenomegaly, no masses and bowel sounds normal  EXT: Extremities warm and without edema.  MS: No gross musculoskeletal defects noted, no edema  SKIN:  No acute rashes or suspicious lesions  NEUROLOGIC:  Grossly nonfocal. Normal strength and tone, mentation intact and speech normal  PSYCHIATRIC: Mood stable, mentation appears normal, affect normal  HEMATOLOGIC/LYMPHATIC: No lymphadenopathy or bleeding

## 2020-03-06 LAB
HIV 1+2 AB+HIV1 P24 AG SERPL QL IA: NONREACTIVE
MYCOBACTERIUM SPEC CULT: ABNORMAL
SPECIMEN SOURCE: ABNORMAL

## 2020-03-11 ENCOUNTER — HEALTH MAINTENANCE LETTER (OUTPATIENT)
Age: 70
End: 2020-03-11

## 2020-03-17 ENCOUNTER — VIRTUAL VISIT (OUTPATIENT)
Dept: INFECTIOUS DISEASES | Facility: CLINIC | Age: 70
End: 2020-03-17
Attending: INTERNAL MEDICINE
Payer: MEDICARE

## 2020-03-17 DIAGNOSIS — A31.9 MYCOBACTERIUM, ATYPICAL: Primary | ICD-10-CM

## 2020-03-18 NOTE — PATIENT INSTRUCTIONS
1. Given the improvement of the CT chest findings, no clear evidence that the symptoms are from an infectious source versus progression of the COPD and the fact that in most of the cases Mycobacterium gordonae is not pathogenic, I will not start anti-mycobacterial treatment at this moment.    2. I would truly appreciate if sputum cultures could be obtained once every 1-2 months over the next 6 months.    3. If sputum cultures are persistently positive over the next 6 months, I would suggest to obtain a repeat CT chest in 6 months from now and, if findings are worse, please re-contact me for re-discussion regarding treatment.     4. I will send a communication to Dr. Busch to discuss the plan

## 2020-03-18 NOTE — PROGRESS NOTES
"John Yanes is a 70 year old male who is being evaluated via a billable telephone visit.      The patient has been notified of following:     \"This telephone visit will be conducted via a call between you and your physician/provider. We have found that certain health care needs can be provided without the need for a physical exam.  This service lets us provide the care you need with a short phone conversation.  If a prescription is necessary we can send it directly to your pharmacy.  If lab work is needed we can place an order for that and you can then stop by our lab to have the test done at a later time.    If during the course of the call the physician/provider feels a telephone visit is not appropriate, you will not be charged for this service.\"       HPI:    Mr. John Yanes is a 70 year-old male with PMHx significant for advanced/severe  COPD and chronic bronchitis (FEV1 28% and desaturation  when walking requiring O2 2L/min that he uses for about 10 hrs a day)  being managed by pulmonology (Dr. 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    His CT scans from 5665-9515 showed bilateral sub-centimeter lung nodules. Of note, the CT chest from 2/8/18 showed, in addition, a suspicious spiculated 10 mm nodule in the left upper lobe. He had a PET scan performed in outside hospital ([facility]) which is in our PACS system although I was not able to find a formal reading for this test. I asked nuclear medicine to upload in our system and requested our radiology team to perform a reading. I see that the PET scan is our system but I do not see a reading from our radiology yet. I ordered a new CT chest and it was performed on 3/4/20. It showed that " the previously seen spiculated nodule in the medial left upper lobe on 2/8/2018 is significantly decreased in size, now measuring 3 mm  (previously 9 x 5 mm).       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 It is not clear to me if the respiratory symptoms are in fact related to an infectious process or the underlying COPD. Most recent CT chest showed that the medial left upper lobe nodule is improved and the other bilateral sub-centimeter nodules are unchanged.  His basic labs and inflammatory markers are normal. HIV test is negative. I discussed the case with a [facility] expert (through their online case discussion system - no patient identifying information was shared) and, given the improvement of the CT chest findings, no clear evidence if the symptoms are from an infectious source versus progression of the COPD and the fact that in most of the cases Mycobacterium gordonae is not pathogenic I will not  Start anti-mycobacteral treatment at this moment. I would truly appreciate that sputum cultures could be obtained once every 1-2 months over the next 6 months. If sputums are persistently positive over the next 6 months I would suggest to obtain a repeat C chest  In 6 months from now and if findings are worse, please re-contact me for re-discussion regarding treatment.     Recommendations:    1. Given the improvement of the CT chest findings, no clear evidence that the symptoms are from an infectious source versus progression of the COPD and the fact that in most of the cases Mycobacterium gordonae is not  pathogenic, I will not start anti-mycobacterial treatment at this moment.    2. I would truly appreciate if sputum cultures could be obtained once every 1-2 months over the next 6 months.    3. If sputum cultures are persistently positive over the next 6 months, I would suggest to obtain a repeat CT chest in 6 months from now and, if findings are worse, please re-contact me for re-discussion regarding treatment.     4. I will send a communication to Dr. Busch to discuss the plan          I have reviewed and updated the patient's Past Medical History, Social History, Family History and Medication List.    ALLERGIES  Amoxicillin-pot clavulanate      Phone call contact time  Call Started at: 10:21 am  Call Ended at: 10:40 am

## 2020-03-19 ENCOUNTER — TELEPHONE (OUTPATIENT)
Dept: PULMONOLOGY | Facility: CLINIC | Age: 70
End: 2020-03-19

## 2020-03-19 DIAGNOSIS — J43.9 COPD (CHRONIC OBSTRUCTIVE PULMONARY DISEASE) WITH EMPHYSEMA (H): Primary | ICD-10-CM

## 2020-03-19 NOTE — TELEPHONE ENCOUNTER
----- Message from Holden Busch MD sent at 3/19/2020  2:48 PM CDT -----  Heydi -     Thanks for the consult. We can arrange.      Pulmonary nurses - Can you set John up for AFB smear and culture every 2 months.  First one can be about 1 month from now.  Chest CT scan 6 months from now.     -Sebastian  ----- Message -----  From: Heydi Saenz MD  Sent: 3/18/2020   5:47 PM CDT  To: Holden Busch MD    Hello Dr. Busch,    I saw Mr. Yanes in person 2 weeks ago and did a phone visit yesterday. His repeat CT chest showed improvement of the left upper nodule and the other bilateral sub-centimeter nodules are the same. Since Mycobacterium gordodae is usually not pathogenic and the CT chest improved, I will not start treatment at this moment. I discussed the case with one the the [facility] experts and he agreed that we should hold off on treatment for now and keep monitoring.    I would suggest to obtain a sputum culture every month or every 2 months over the next 6 months or so and, if persistently positive, then a new CT chest can be performed in 6 months for now. If the CT findings are progressing, maybe we can re-consider treatment.    I would like to know if you are ok with this plan and if you could do the monitoring.    All the best    Heydi

## 2020-03-19 NOTE — TELEPHONE ENCOUNTER
RN spoke with patient and reviewed plan of care with patient. Pt requested sample cups be mailed to him, RN sent in mail with directions. Pt will call  to 6 month facundo to schedule CT scan. Encouraged pt to call if further questions or concerns.  Marylin Felipe RN BSN

## 2020-04-05 ENCOUNTER — MYC REFILL (OUTPATIENT)
Dept: PULMONOLOGY | Facility: CLINIC | Age: 70
End: 2020-04-05

## 2020-04-05 DIAGNOSIS — J44.9 SEVERE CHRONIC OBSTRUCTIVE PULMONARY DISEASE (H): ICD-10-CM

## 2020-04-06 RX ORDER — BUDESONIDE 0.5 MG/2ML
0.5 INHALANT ORAL 2 TIMES DAILY
Qty: 360 ML | Refills: 4 | Status: SHIPPED | OUTPATIENT
Start: 2020-04-06 | End: 2020-05-08

## 2020-04-06 RX ORDER — ARFORMOTEROL TARTRATE 15 UG/2ML
15 SOLUTION RESPIRATORY (INHALATION) 2 TIMES DAILY
Qty: 360 ML | Refills: 4 | Status: SHIPPED | OUTPATIENT
Start: 2020-04-06 | End: 2020-05-08

## 2020-04-07 NOTE — TELEPHONE ENCOUNTER
Refills sent to Pharmacy electronically. Pharmacy sent back fax stating they need additional information regarding the duration of usage. Spoke with Pharmacist Brett and he stated we can fax back the duration of usage along with a nurses signature. Faxed back prescriptions to [facility]'s pharmacy.

## 2020-04-14 ENCOUNTER — MYC REFILL (OUTPATIENT)
Dept: PULMONOLOGY | Facility: CLINIC | Age: 70
End: 2020-04-14

## 2020-04-14 DIAGNOSIS — J44.9 SEVERE CHRONIC OBSTRUCTIVE PULMONARY DISEASE (H): ICD-10-CM

## 2020-04-14 RX ORDER — ALBUTEROL SULFATE 90 UG/1
2 AEROSOL, METERED RESPIRATORY (INHALATION) EVERY 6 HOURS PRN
Qty: 3 INHALER | Refills: 4 | Status: SHIPPED | OUTPATIENT
Start: 2020-04-14 | End: 2021-02-25

## 2020-05-08 ENCOUNTER — TELEPHONE (OUTPATIENT)
Facility: CLINIC | Age: 70
End: 2020-05-08

## 2020-05-08 DIAGNOSIS — J44.9 SEVERE CHRONIC OBSTRUCTIVE PULMONARY DISEASE (H): ICD-10-CM

## 2020-05-08 RX ORDER — BUDESONIDE 0.5 MG/2ML
0.5 INHALANT ORAL 2 TIMES DAILY
Qty: 360 ML | Refills: 4 | Status: SHIPPED | OUTPATIENT
Start: 2020-05-08 | End: 2020-09-01

## 2020-05-08 RX ORDER — ARFORMOTEROL TARTRATE 15 UG/2ML
15 SOLUTION RESPIRATORY (INHALATION) 2 TIMES DAILY
Qty: 360 ML | Refills: 4 | Status: SHIPPED | OUTPATIENT
Start: 2020-05-08 | End: 2020-09-01

## 2020-05-08 NOTE — TELEPHONE ENCOUNTER
[facility] Call Center    Phone Message    May a detailed message be left on voicemail: yes     Reason for Call: Other: No from [facility] also stated they need chart notes as well as prescription info and she would like a call back to discuss where and how to get those to [facility] phone number is [phone]     Action Taken: Message routed to:  [facility] Pulm    Travel Screening: Not Applicable

## 2020-05-08 NOTE — TELEPHONE ENCOUNTER
[facility] Call Center    Phone Message    May a detailed message be left on voicemail: yes     Reason for Call: Other: Pt would like to change pharmacy to [facility] located through the drop down under [facility] or [facility] in [facility]. He needs the perscription for  Brovana and Budesonide. If any questions please reach out to pt    Action Taken: Message routed to:  [facility] Pulm    Travel Screening: Not Applicable

## 2020-05-20 ENCOUNTER — MYC MEDICAL ADVICE (OUTPATIENT)
Dept: PULMONOLOGY | Facility: CLINIC | Age: 70
End: 2020-05-20

## 2020-05-20 NOTE — TELEPHONE ENCOUNTER
Contacted [facility] pharmacy at pt request, he does not want to receive Pulmicort or Brovana from them.  These prescriptions should remain at [facility] pharmacy.

## 2020-05-20 NOTE — TELEPHONE ENCOUNTER
Spoke to John to remind him of need for sputum samples every 2 months to monitor for Mycobacterium.  John has not followed up on this d/t COVID, he is quarantining this week prior to cyctectomy, he will provide sputum sample as soon as possible. Advised that per Dr. Busch, endobronchial valves could be considered depending on status of mycobacterium infection.  Will follow up with pt post surgery.

## 2020-05-26 DIAGNOSIS — J43.9 COPD (CHRONIC OBSTRUCTIVE PULMONARY DISEASE) WITH EMPHYSEMA (H): ICD-10-CM

## 2020-05-26 PROCEDURE — 87116 MYCOBACTERIA CULTURE: CPT | Mod: 90

## 2020-05-26 PROCEDURE — 99000 SPECIMEN HANDLING OFFICE-LAB: CPT

## 2020-05-26 PROCEDURE — 87206 SMEAR FLUORESCENT/ACID STAI: CPT | Mod: 90

## 2020-05-28 LAB
ACID FAST STN SPEC QL: NORMAL
ACID FAST STN SPEC QL: NORMAL
SPECIMEN SOURCE: NORMAL

## 2020-06-10 DIAGNOSIS — J43.9 COPD (CHRONIC OBSTRUCTIVE PULMONARY DISEASE) WITH EMPHYSEMA (H): ICD-10-CM

## 2020-06-10 PROCEDURE — 87206 SMEAR FLUORESCENT/ACID STAI: CPT | Mod: 90

## 2020-06-10 PROCEDURE — 87116 MYCOBACTERIA CULTURE: CPT | Mod: 90

## 2020-06-10 PROCEDURE — 99000 SPECIMEN HANDLING OFFICE-LAB: CPT

## 2020-06-14 LAB
ACID FAST STN SPEC QL: NORMAL
ACID FAST STN SPEC QL: NORMAL
SPECIMEN SOURCE: NORMAL

## 2020-06-16 ENCOUNTER — TELEPHONE (OUTPATIENT)
Dept: PULMONOLOGY | Facility: CLINIC | Age: 70
End: 2020-06-16

## 2020-06-16 NOTE — TELEPHONE ENCOUNTER
Contacted patient as requested to let him know that sputum samples needed every 2 months. He did provide samples on 6/10.  States PCP recently rx'd prednisone burst for dyspnea and cough, he's wondering whether Dr. Busch would thinks it would be appropriate for him to be on this permanently.  Would be open to a virtual visit to discuss.  Message to scheduling to help arrange appointment.

## 2020-06-22 ENCOUNTER — VIRTUAL VISIT (OUTPATIENT)
Dept: PULMONOLOGY | Facility: CLINIC | Age: 70
End: 2020-06-22
Payer: MEDICARE

## 2020-06-22 DIAGNOSIS — J44.9 STAGE 4 VERY SEVERE COPD BY GOLD CLASSIFICATION (H): ICD-10-CM

## 2020-06-22 DIAGNOSIS — J96.11 HYPOXEMIC RESPIRATORY FAILURE, CHRONIC (H): ICD-10-CM

## 2020-06-22 PROBLEM — J43.9 COPD (CHRONIC OBSTRUCTIVE PULMONARY DISEASE) WITH EMPHYSEMA (H): Status: RESOLVED | Noted: 2017-11-16 | Resolved: 2020-06-22

## 2020-06-22 NOTE — LETTER
"    6/22/2020         RE: John Yanes  [address]   [place] [address]        Dear Colleague,    Thank you for referring your patient, John Yanes, to the [facility]. Please see a copy of my visit note below.    John Yanes is a 70 year old male who is being evaluated via a billable telephone visit.      The patient has been notified of following:     \"This telephone visit will be conducted via a call between you and your physician/provider. We have found that certain health care needs can be provided without the need for a physical exam.  This service lets us provide the care you need with a short phone conversation.  If a prescription is necessary we can send it directly to your pharmacy.  If lab work is needed we can place an order for that and you can then stop by our lab to have the test done at a later time.    Telephone visits are billed at different rates depending on your insurance coverage. During this emergency period, for some insurers they may be billed the same as an in-person visit.  Please reach out to your insurance provider with any questions.    If during the course of the call the physician/provider feels a telephone visit is not appropriate, you will not be charged for this service.\"    Patient has given verbal consent for Telephone visit?  Yes      How would you like to obtain your AVS? Sukumar    Chief complaint: Shortness of breath  History of present illness: This is a 70-year-old male with a past medical history of very severe COPD who presents as a follow-up visit for his lung disease.  This visit was conducted over the phone due to the coronavirus pandemic.    Endorses progressive shortness of breath with exertion.  He has to stop about 3 times as he was walking up the staircase just to have enough breath.  He has difficulty showering, needs to bring his oxygen into the shower with him.  His cough is much better now, and there is less mucus " production.  He is on Pulmicort, Brovana, and nebulized glycopyrrolate.  He is using rescue Xopenex nebs a couple times a day.  He is unable to get any substantial exercise done due to his limiting dyspnea with exertion.  He did have a recent cystoscopy which noted a bladder stone, but no evidence of malignancy.  A biopsy was also negative.    Spirometry from October 2018 reviewed.  FEV1/FVC ratio 0.26.  FVC is 3.26 L which is 79% predicted and the FEV1 is 0.85 L which is 27% predicted.  Residual volume is 291% predicted and his total lung capacity is 163% predicted.  His diffusion capacity is 55% predicted.    Data:  Chest CT scan March 2020:  1. Unchanged calcified, partially calcified, and noncalcified solid  pulmonary nodules in comparison to 1/25/2016. No new or enlarging  pulmonary nodule.  2. Advanced emphysematous changes of the lungs.  3. Hepatic steatosis.    Acid fast bacilli sputum cultures:  Mycobacterium gordonae positive on October 21, 2019, January 10, 2020.  Mycobacterium gordonae no growth to date on May 26, 2020 and Jeanette 10, 2020    Bladder biopsy July 2018:   A) URINARY BLADDER, BIOPSY:   1. Invasive urothelial carcinoma, high grade (WH0 2004 terminology)      a. Depth of invasion: Superficial muscularis propria      b. Angio-lymphatic invasion: Absent   2. Associated carcinoma in situ: Absent      Bladder biopsy May 2020:   A) URINARY BLADDER, BIOPSY:   1.  Ulcer, necrosis and dystrophic calcification consistent with prior therapy related changes   2.  Negative for malignancy   3.  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  I have previously had his images reviewed by our interventional pulmonary team for  endobronchial valves, and they felt like he would not be a good candidate due to the distribution of his emphysema.  I may readdress this in the future, but I would like to make sure that there is no more Mycobacterium in his lungs.  He has been evaluated by ID, and this was thought to be colonizer. My concern was that it could be responsible for his waxing/waning nodules and worsensed symptoms. We are waiting on repeat sputum specimens for him.  He is very short of breath with minimal exertion.  I discussed NIOV with him today, and we will try to see if this give him any symptomatic relief.  I think this would be a beneficial therapy for him so that he can try to remain mobile and active.  He has persistent dyspnea despite maximum medical therapy with nebulizers and oxygen.     A bigger issue right now is his lung transplant candidacy.  His relatively recent bladder cancer is the main cristian.  This was limited to the bladder in 2018.  A recent cystoscopy does not reveal any evidence of tumor.  I will have the transplant team review his chart and see if this is still a contraindication.     Phone call duration: 30 minutes    Holden Busch MD

## 2020-06-22 NOTE — PROGRESS NOTES
"John Yanes is a 70 year old male who is being evaluated via a billable telephone visit.      The patient has been notified of following:     \"This telephone visit will be conducted via a call between you and your physician/provider. We have found that certain health care needs can be provided without the need for a physical exam.  This service lets us provide the care you need with a short phone conversation.  If a prescription is necessary we can send it directly to your pharmacy.  If lab work is needed we can place an order for that and you can then stop by our lab to have the test done at a later time.    Telephone visits are billed at different rates depending on your insurance coverage. During this emergency period, for some insurers they may be billed the same as an in-person visit.  Please reach out to your insurance provider with any questions.    If during the course of the call the physician/provider feels a telephone visit is not appropriate, you will not be charged for this service.\"    Patient has given verbal consent for Telephone visit?  Yes      How would you like to obtain your AVS? Sukumar    Chief complaint: Shortness of breath  History of present illness: This is a 70-year-old male with a past medical history of very severe COPD who presents as a follow-up visit for his lung disease.  This visit was conducted over the phone due to the coronavirus pandemic.    Endorses progressive shortness of breath with exertion.  He has to stop about 3 times as he was walking up the staircase just to have enough breath.  He has difficulty showering, needs to bring his oxygen into the shower with him.  His cough is much better now, and there is less mucus production.  He is on Pulmicort, Brovana, and nebulized glycopyrrolate.  He is using rescue Xopenex nebs a couple times a day.  He is unable to get any substantial exercise done due to his limiting dyspnea with exertion.  He did have a recent cystoscopy " which noted a bladder stone, but no evidence of malignancy.  A biopsy was also negative.    Spirometry from October 2018 reviewed.  FEV1/FVC ratio 0.26.  FVC is 3.26 L which is 79% predicted and the FEV1 is 0.85 L which is 27% predicted.  Residual volume is 291% predicted and his total lung capacity is 163% predicted.  His diffusion capacity is 55% predicted.    Data:  Chest CT scan March 2020:  1. Unchanged calcified, partially calcified, and noncalcified solid  pulmonary nodules in comparison to 1/25/2016. No new or enlarging  pulmonary nodule.  2. Advanced emphysematous changes of the lungs.  3. Hepatic steatosis.    Acid fast bacilli sputum cultures:  Mycobacterium gordonae positive on October 21, 2019, January 10, 2020.  Mycobacterium gordonae no growth to date on May 26, 2020 and Jeanette 10, 2020    Bladder biopsy July 2018:   A) URINARY BLADDER, BIOPSY:   1. Invasive urothelial carcinoma, high grade (WH0 2004 terminology)      a. Depth of invasion: Superficial muscularis propria      b. Angio-lymphatic invasion: Absent   2. Associated carcinoma in situ: Absent      Bladder biopsy May 2020:   A) URINARY BLADDER, BIOPSY:   1.  Ulcer, necrosis and dystrophic calcification consistent with prior therapy related changes   2.  Negative for malignancy   3.  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  I have previously had his images reviewed by our interventional pulmonary team for endobronchial valves, and they felt like he would not be a good candidate due to the distribution of his emphysema.  I may readdress this in the future, but I would like to make sure that there is no more Mycobacterium in his lungs.  He has been evaluated by ID,  and this was thought to be colonizer. My concern was that it could be responsible for his waxing/waning nodules and worsensed symptoms. We are waiting on repeat sputum specimens for him.  He is very short of breath with minimal exertion.  I discussed NIOV with him today, and we will try to see if this give him any symptomatic relief.  I think this would be a beneficial therapy for him so that he can try to remain mobile and active.  He has persistent dyspnea despite maximum medical therapy with nebulizers and oxygen.     A bigger issue right now is his lung transplant candidacy.  His relatively recent bladder cancer is the main cristian.  This was limited to the bladder in 2018.  A recent cystoscopy does not reveal any evidence of tumor.  I will have the transplant team review his chart and see if this is still a contraindication.     Phone call duration: 30 minutes    Holden Busch MD

## 2020-06-30 ENCOUNTER — TELEPHONE (OUTPATIENT)
Facility: CLINIC | Age: 70
End: 2020-06-30

## 2020-07-09 ENCOUNTER — COMMITTEE REVIEW (OUTPATIENT)
Dept: TRANSPLANT | Facility: CLINIC | Age: 70
End: 2020-07-09

## 2020-07-20 DIAGNOSIS — J44.9 SEVERE CHRONIC OBSTRUCTIVE PULMONARY DISEASE (H): ICD-10-CM

## 2020-07-20 RX ORDER — LEVALBUTEROL INHALATION SOLUTION 1.25 MG/3ML
1 SOLUTION RESPIRATORY (INHALATION) EVERY 6 HOURS PRN
Qty: 540 ML | Refills: 4 | Status: SHIPPED | OUTPATIENT
Start: 2020-07-20 | End: 2021-10-18

## 2020-07-22 LAB
MYCOBACTERIUM SPEC CULT: NORMAL
MYCOBACTERIUM SPEC CULT: NORMAL
SPECIMEN SOURCE: NORMAL

## 2020-07-30 ENCOUNTER — TRANSFERRED RECORDS (OUTPATIENT)
Dept: HEALTH INFORMATION MANAGEMENT | Facility: CLINIC | Age: 70
End: 2020-07-30

## 2020-08-06 LAB
MYCOBACTERIUM SPEC CULT: NORMAL
MYCOBACTERIUM SPEC CULT: NORMAL
SPECIMEN SOURCE: NORMAL

## 2020-08-10 PROCEDURE — 99000 SPECIMEN HANDLING OFFICE-LAB: CPT

## 2020-08-10 PROCEDURE — 87116 MYCOBACTERIA CULTURE: CPT | Mod: 90

## 2020-08-10 PROCEDURE — 87015 SPECIMEN INFECT AGNT CONCNTJ: CPT | Mod: 90

## 2020-08-10 PROCEDURE — 87206 SMEAR FLUORESCENT/ACID STAI: CPT | Mod: 90

## 2020-08-11 DIAGNOSIS — J43.9 COPD (CHRONIC OBSTRUCTIVE PULMONARY DISEASE) WITH EMPHYSEMA (H): ICD-10-CM

## 2020-08-14 ENCOUNTER — TRANSFERRED RECORDS (OUTPATIENT)
Dept: HEALTH INFORMATION MANAGEMENT | Facility: CLINIC | Age: 70
End: 2020-08-14

## 2020-08-14 LAB
ACID FAST STN SPEC QL: NORMAL
ACID FAST STN SPEC QL: NORMAL
SPECIMEN SOURCE: NORMAL

## 2020-08-25 ENCOUNTER — CARE COORDINATION (OUTPATIENT)
Dept: PULMONOLOGY | Facility: CLINIC | Age: 70
End: 2020-08-25

## 2020-08-25 NOTE — PROGRESS NOTES
Left message for pt to return call to clinic to let him know that Dr. Busch has reviewed PET- CT and there is no need for a follow up CT at this time.  Also follow up on whether [id number] is helping with symptoms.

## 2020-08-26 NOTE — PROGRESS NOTES
"Contacted John to let him know that there is no need for follow up CT at this time.  Pt confirmed that he has been using his [id number] daily - he does take it off occasionally \"becomes a little annoying\".  Does notice an improvement in dyspnea when he's wearing it.  Most noticeable improvement in speed of recovery after becoming dyspneic while not wearing it.   "

## 2020-09-01 DIAGNOSIS — J44.9 SEVERE CHRONIC OBSTRUCTIVE PULMONARY DISEASE (H): ICD-10-CM

## 2020-09-01 RX ORDER — ARFORMOTEROL TARTRATE 15 UG/2ML
15 SOLUTION RESPIRATORY (INHALATION) 2 TIMES DAILY
Qty: 360 ML | Refills: 4 | Status: SHIPPED | OUTPATIENT
Start: 2020-09-01 | End: 2021-02-02

## 2020-09-01 RX ORDER — BUDESONIDE 0.5 MG/2ML
0.5 INHALANT ORAL 2 TIMES DAILY
Qty: 360 ML | Refills: 4 | Status: SHIPPED | OUTPATIENT
Start: 2020-09-01 | End: 2021-02-02

## 2020-09-03 ENCOUNTER — COMMUNICATION - HEALTHEAST (OUTPATIENT)
Dept: SCHEDULING | Facility: CLINIC | Age: 70
End: 2020-09-03

## 2020-09-03 ENCOUNTER — CARE COORDINATION (OUTPATIENT)
Dept: PULMONOLOGY | Facility: CLINIC | Age: 70
End: 2020-09-03

## 2020-09-03 DIAGNOSIS — J44.1 COPD EXACERBATION (H): Primary | ICD-10-CM

## 2020-09-03 NOTE — PROGRESS NOTES
Contacted John as requested. John was contacted this am by our scheduling department to rearrange his appointment with Dr. Busch, his appointment is now 11/2.  John has been struggling with progressive SOB and is requesting prednisone to tide him over until next appointment, rx to be sent to [facility] pharmacy if appropriate.  He is compliant with all pulmonary meds and is using NIOV ~ 6 hours each day, which is helping.  AFB stain on 8/10, negative. Culture still in progress. Contacted [facility] pharmacy regarding Brovana & Pulmicort refills. Medicare part B medications need additional information that cannot be sent electronically or given verbally.  Pharmacist agreed to dispense medication pending return of faxed information.  Message to MD to advise on prednisone.

## 2020-09-09 ENCOUNTER — DOCUMENTATION ONLY (OUTPATIENT)
Dept: CARE COORDINATION | Facility: CLINIC | Age: 70
End: 2020-09-09

## 2020-09-09 RX ORDER — PREDNISONE 20 MG/1
20 TABLET ORAL DAILY
Qty: 10 TABLET | Refills: 0 | Status: SHIPPED | OUTPATIENT
Start: 2020-09-09 | End: 2020-09-19

## 2020-10-07 LAB
MYCOBACTERIUM SPEC CULT: NORMAL
MYCOBACTERIUM SPEC CULT: NORMAL
SPECIMEN SOURCE: NORMAL

## 2020-10-19 ASSESSMENT — ENCOUNTER SYMPTOMS
HEMATURIA: 0
ARTHRALGIAS: 0
FLANK PAIN: 0
MUSCLE CRAMPS: 1
CHILLS: 0
SNORES LOUDLY: 0
POLYDIPSIA: 0
DECREASED CONCENTRATION: 0
DYSURIA: 1
ALTERED TEMPERATURE REGULATION: 0
SPUTUM PRODUCTION: 1
DEPRESSION: 1
DYSPNEA ON EXERTION: 1
PANIC: 0
STIFFNESS: 0
POLYPHAGIA: 0
EYE REDNESS: 0
INCREASED ENERGY: 1
WEIGHT LOSS: 1
POSTURAL DYSPNEA: 1
MYALGIAS: 1
MUSCLE WEAKNESS: 0
DECREASED APPETITE: 0
BACK PAIN: 1
WHEEZING: 1
FATIGUE: 1
HALLUCINATIONS: 0
NERVOUS/ANXIOUS: 1
NIGHT SWEATS: 0
DIFFICULTY URINATING: 1
COUGH: 1
EYE WATERING: 0
SHORTNESS OF BREATH: 1
JOINT SWELLING: 0
FEVER: 0
EYE IRRITATION: 0
HEMOPTYSIS: 0
EYE PAIN: 0
COUGH DISTURBING SLEEP: 1
NECK PAIN: 1
WEIGHT GAIN: 0
INSOMNIA: 0
DOUBLE VISION: 0

## 2020-10-26 DIAGNOSIS — J44.9 STAGE 4 VERY SEVERE COPD BY GOLD CLASSIFICATION (H): ICD-10-CM

## 2020-11-02 ENCOUNTER — OFFICE VISIT (OUTPATIENT)
Dept: PULMONOLOGY | Facility: CLINIC | Age: 70
End: 2020-11-02
Payer: MEDICARE

## 2020-11-02 VITALS
DIASTOLIC BLOOD PRESSURE: 82 MMHG | RESPIRATION RATE: 18 BRPM | SYSTOLIC BLOOD PRESSURE: 139 MMHG | HEART RATE: 98 BPM | WEIGHT: 163 LBS | BODY MASS INDEX: 23.39 KG/M2 | OXYGEN SATURATION: 97 %

## 2020-11-02 DIAGNOSIS — J44.9 STAGE 4 VERY SEVERE COPD BY GOLD CLASSIFICATION (H): Primary | ICD-10-CM

## 2020-11-02 DIAGNOSIS — C67.9 PRIMARY CANCER OF BLADDER (H): ICD-10-CM

## 2020-11-02 PROCEDURE — 99214 OFFICE O/P EST MOD 30 MIN: CPT

## 2020-11-02 PROCEDURE — G0463 HOSPITAL OUTPT CLINIC VISIT: HCPCS | Mod: 25

## 2020-11-02 RX ORDER — PREDNISONE 10 MG/1
10 TABLET ORAL DAILY
Qty: 30 TABLET | Refills: 5 | Status: SHIPPED | OUTPATIENT
Start: 2020-11-02 | End: 2021-05-10

## 2020-11-02 ASSESSMENT — PAIN SCALES - GENERAL: PAINLEVEL: SEVERE PAIN (6)

## 2020-11-02 NOTE — NURSING NOTE
Chief Complaint   Patient presents with     RECHECK     COPD      Medications reviewed and updated.  Vitals taken  Daxa Ames CMA

## 2020-11-02 NOTE — LETTER
11/2/2020         RE: John Yanes  [address]  [place] [address]        Dear Colleague,    Thank you for referring your patient, John Yanes, to the [facility] FOR LUNG SCIENCE AND [facility]. Please see a copy of my visit note below.    [facility]  Pulmonary Medicine  Visit Clinic Note  November 2, 2020         ASSESSMENT & PLAN       Very severe COPD: Advanced emphysema with extreme dyspnea with exertion.  Unfortunately routine medical therapies have not been able to manage his symptoms.  All of his medications are now nebulized, and he is using NIOV to help ambulate at home.    His case has been reviewed and transplant committee, and they have deemed him a poor candidate for transplant due to his age, recent bladder cancer, and osteoporosis with vertebral fractures.    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  He has been evaluated by infectious disease.  They consider this a colonizer, and unless he were to grow it again they would not consider treatment of this.    Endobronchial valves have been discussed with him in the past.  I have shown earlier CT scan images to our interventional pulmonary group, and the did not feel his emphysema was heterogeneous enough to benefit from valve therapy.  I will have them review his most recent scan in July to see if they would reconsider.    I did bring up the idea of hospice care for him, as well as treatment with morphine to help out with his dyspnea.  I explained what hospice care is, and goals of hospice care.  He is not ready to commit to that at this point.    In the meantime, I will place him on 10 mg of prednisone daily to see if that helps out with his symptoms.  I will have him call us back in a month if that is not working, and we could try to reinstitute daily azithromycin.  These therapies do not help out, then I  "think treating with morphine to help out with air hunger and dyspnea would be indicated.    Has already received the flu vaccine.     Sebastian Busch MD          Today's visit note:     Chief Complaint: John Yanes is a 70 year old year old male who is being seen for RECHECK (COPD )      HISTORY OF PRESENT ILLNESS:    This is a 70-year-old male with a history of COPD and bladder cancer who is presenting for pulmonary follow-up.    Unfortunately, he has not been doing well lately.  His shortness of breath is pretty incapacitating for him, and he struggles to get between rooms at home.  We started him on noninvasive open ventilation (NIOV), and this is given him mild to modest improvement in his shortness of breath at home.  He is still on Pulmicort, Brovana, and longhala.  He does think his cough is improved, but it is still present.  He gets occasional bouts of \"anxiety\" in the setting of shortness of breath.         Past Medical and Surgical History:     Past Medical History:   Diagnosis Date     Bladder cancer (H)      Chronic pain      COPD (chronic obstructive pulmonary disease) (H)      Depression      GERD (gastroesophageal reflux disease)      High cholesterol      Hypertension      Osteoporosis 06/2016     Vertebral fracture, osteoporotic (H) 06/2016    T5     Past Surgical History:   Procedure Laterality Date     AS ESOPHAGOSCOPY, DIAGNOSTIC       AS KNEE SCOPE, DIAGNOSTIC       CYSTOSCOPY       CYSTOSCOPY, TRANSURETHRAL RESECTION (TUR) TUMOR BLADDER, COMBINED       RELEASE CARPAL TUNNEL             Family History:     Family History   Problem Relation Age of Onset     Dementia Mother      Liver Cancer Father      Heart Failure Brother               Social History:     Social History     Socioeconomic History     Marital status:      Spouse name: Not on file     Number of children: Not on file     Years of education: Not on file     Highest education level: Not on file   Occupational History     " 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     Occupation:      Occupation:    Social Needs     Financial resource strain: Not on file     Food insecurity     Worry: Not on file     Inability: Not on file     Transportation needs     Medical: Not on file     Non-medical: Not on file   Tobacco Use     Smoking status: Former Smoker     Packs/day: 2.00     Years: 35.00     Pack years: 70.00     Types: Cigarettes     Quit date: 2001     Years since quittin.8     Smokeless tobacco: Never Used   Substance and Sexual Activity     Alcohol use: Not on file     Drug use: Yes     Types: Marijuana     Comment: medical use for pain (takes pills)     Sexual activity: Not on file   Lifestyle     Physical activity     Days per week: Not on file     Minutes per session: Not on file     Stress: Not on file   Relationships     Social connections     Talks on phone: Not on file     Gets together: Not on file     Attends Druze service: Not on file     Active member of club or organization: Not on file     Attends meetings of clubs or organizations: Not on file     Relationship status: Not on file     Intimate partner violence     Fear of current or ex partner: Not on file     Emotionally abused: Not on file     Physically abused: Not on file     Forced sexual activity: Not on file   Other Topics Concern     Parent/sibling w/ CABG, MI or angioplasty before 65F 55M? Not Asked   Social History Narrative     Not on file            Medications:     Current Outpatient Medications   Medication     albuterol (VENTOLIN HFA) 108 (90 Base) MCG/ACT inhaler     alendronate (FOSAMAX) 70 MG tablet     arformoterol (BROVANA) 15 MCG/2ML NEBU neb solution     budesonide (PULMICORT) 0.5 MG/2ML neb solution     buPROPion (WELLBUTRIN XL) 300 MG 24 hr tablet     levalbuterol (XOPENEX) 1.25 MG/3ML neb solution     omeprazole (PRILOSEC) 40 MG capsule     order for DME     order for DME      tamsulosin (FLOMAX) 0.4 MG capsule     Glycopyrrolate 25 MCG/ML SOLN     No current facility-administered medications for this visit.             Review of Systems:     Answers for HPI/ROS submitted by the patient on 10/19/2020   General Symptoms: Yes  Skin Symptoms: No  HENT Symptoms: No  EYE SYMPTOMS: Yes  HEART SYMPTOMS: No  LUNG SYMPTOMS: Yes  INTESTINAL SYMPTOMS: No  URINARY SYMPTOMS: Yes  REPRODUCTIVE SYMPTOMS: No  SKELETAL SYMPTOMS: Yes  BLOOD SYMPTOMS: No  NERVOUS SYSTEM SYMPTOMS: No  MENTAL HEALTH SYMPTOMS: Yes  Fever: No  Loss of appetite: No  Weight loss: Yes  Weight gain: No  Fatigue: Yes  Night sweats: No  Chills: No  Increased stress: Yes  Excessive hunger: No  Excessive thirst: No  Feeling hot or cold when others believe the temperature is normal: No  Loss of height: No  Post-operative complications: No  Surgical site pain: No  Hallucinations: No  Change in or Loss of Energy: Yes  Hyperactivity: No  Confusion: No  Eye pain: No  Vision loss: No  Dry eyes: No  Watery eyes: No  Eye bulging: No  Double vision: No  Flashing of lights: No  Spots: Yes  Floaters: No  Redness: No  Crossed eyes: No  Tunnel Vision: No  Yellowing of eyes: No  Eye irritation: No  Cough: Yes  Sputum or phlegm: Yes  Coughing up blood: No  Difficulty breating or shortness of breath: Yes  Snoring: No  Wheezing: Yes  Difficulty breathing on exertion: Yes  Nighttime Cough: Yes  Difficulty breathing when lying flat: Yes  Trouble holding urine or incontinence: Yes  Pain or burning: Yes  Trouble starting or stopping: No  Increased frequency of urination: Yes  Blood in urine: No  Decreased frequency of urination: No  Frequent nighttime urination: Yes  Flank pain: No  Difficulty emptying bladder: Yes  Back pain: Yes  Muscle aches: Yes  Neck pain: Yes  Swollen joints: No  Joint pain: No  Bone pain: Yes  Muscle cramps: Yes  Muscle weakness: No  Joint stiffness: No  Bone fracture: No  Nervous or Anxious: Yes  Depression: Yes  Trouble  sleeping: No  Trouble thinking or concentrating: No  Mood changes: No  Panic attacks: No      PHYSICAL EXAM:  /82   Pulse 98   Resp 18   Wt 73.9 kg (163 lb)   SpO2 97%   BMI 23.39 kg/m        General:  Appears winded with talking. Here with a  Walker.   Eyes: Anicteric  Ears: Hearing grossly normal  Mouth: Oral mucosa is moist, without any lesions. No oropharyngeal exudate.  Neck: supple, no thyromegaly  Lymphatics: No cervical or supraclavicular nodes  Respiratory: Very distant breath sounds.  No wheezing  Cardiac: RRR, normal S1, S2. No murmurs.  Abdomen: Soft, NT/ND  Musculoskeletal: Extremities normal. No clubbing. No cyanosis. No edema.  Skin: No rash on limited exam  Neuro: Normal mentation. Normal speech.  Psych:Normal affect           Data:   All laboratory and imaging data reviewed.      Sputum cultures on January 10, 2020 are positive for Mycobacterium gordonae.  Subsequent sputum cultures on May 26, Jeanette 10, and August 10 are negative for this organism.      I reviewed the chest CT scan images from July 30, 2020.  I agree with radiologist interpretation of advanced centrilobular emphysema with no acute infiltrates.  A 6 x 3 mm nodule in the anterior medial left upper lobe previously measured 9 x 6 mm.  Other bilateral pulmonary nodules are unchanged with the largest measuring 5 mm in the right upper lobe.  No new or enlarging pulmonary nodules.  Calcified granuloma in both lungs.  No mediastinal lymphadenopathy and a normal caliber aorta.    Chest CT scan: February 2018  1. Multiple bilateral pulmonary nodules including a suspicious  spiculated 10 mm nodule in the left upper lobe, consider PET/CT and/or  tissue sampling for further evaluation.  2. 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  3. Diffuse bony demineralization with multiple age indeterminate  presumably pathologic impression fractures of the thoracolumbar spine.           Again, thank you for  allowing me to participate in the care of your patient.        Sincerely,        Holden Busch MD

## 2020-11-02 NOTE — PROGRESS NOTES
[facility]  Pulmonary Medicine  Visit Clinic Note  November 2, 2020         ASSESSMENT & PLAN       Very severe COPD: Advanced emphysema with extreme dyspnea with exertion.  Unfortunately routine medical therapies have not been able to manage his symptoms.  All of his medications are now nebulized, and he is using NIOV to help ambulate at home.    His case has been reviewed and transplant committee, and they have deemed him a poor candidate for transplant due to his age, recent bladder cancer, and osteoporosis with vertebral fractures.    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  He has been evaluated by infectious disease.  They consider this a colonizer, and unless he were to grow it again they would not consider treatment of this.    Endobronchial valves have been discussed with him in the past.  I have shown earlier CT scan images to our interventional pulmonary group, and the did not feel his emphysema was heterogeneous enough to benefit from valve therapy.  I will have them review his most recent scan in July to see if they would reconsider.    I did bring up the idea of hospice care for him, as well as treatment with morphine to help out with his dyspnea.  I explained what hospice care is, and goals of hospice care.  He is not ready to commit to that at this point.    In the meantime, I will place him on 10 mg of prednisone daily to see if that helps out with his symptoms.  I will have him call us back in a month if that is not working, and we could try to reinstitute daily azithromycin.  These therapies do not help out, then I think treating with morphine to help out with air hunger and dyspnea would be indicated.    Has already received the flu vaccine.     Sebastian Busch MD          Today's visit note:     Chief Complaint: John Yanes is a 70 year old year old male who is being seen for RECHECK (COPD  ")      HISTORY OF PRESENT ILLNESS:    This is a 70-year-old male with a history of COPD and bladder cancer who is presenting for pulmonary follow-up.    Unfortunately, he has not been doing well lately.  His shortness of breath is pretty incapacitating for him, and he struggles to get between rooms at home.  We started him on noninvasive open ventilation (NIOV), and this is given him mild to modest improvement in his shortness of breath at home.  He is still on Pulmicort, Brovana, and longhala.  He does think his cough is improved, but it is still present.  He gets occasional bouts of \"anxiety\" in the setting of shortness of breath.         Past Medical and Surgical History:     Past Medical History:   Diagnosis Date     Bladder cancer (H)      Chronic pain      COPD (chronic obstructive pulmonary disease) (H)      Depression      GERD (gastroesophageal reflux disease)      High cholesterol      Hypertension      Osteoporosis 06/2016     Vertebral fracture, osteoporotic (H) 06/2016    T5     Past Surgical History:   Procedure Laterality Date     AS ESOPHAGOSCOPY, DIAGNOSTIC       AS KNEE SCOPE, DIAGNOSTIC       CYSTOSCOPY       CYSTOSCOPY, TRANSURETHRAL RESECTION (TUR) TUMOR BLADDER, COMBINED       RELEASE CARPAL TUNNEL             Family History:     Family History   Problem Relation Age of Onset     Dementia Mother      Liver Cancer Father      Heart Failure Brother               Social History:     Social History     Socioeconomic History     Marital status:      Spouse name: Not on file     Number of children: Not on file     Years of education: Not on file     Highest education level: Not on file   Occupational History     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     Occupation:      Occupation:    Social Needs     Financial resource strain: Not on file     Food insecurity     Worry: Not on file     Inability: Not on file     Transportation " needs     Medical: Not on file     Non-medical: Not on file   Tobacco Use     Smoking status: Former Smoker     Packs/day: 2.00     Years: 35.00     Pack years: 70.00     Types: Cigarettes     Quit date: 2001     Years since quittin.8     Smokeless tobacco: Never Used   Substance and Sexual Activity     Alcohol use: Not on file     Drug use: Yes     Types: Marijuana     Comment: medical use for pain (takes pills)     Sexual activity: Not on file   Lifestyle     Physical activity     Days per week: Not on file     Minutes per session: Not on file     Stress: Not on file   Relationships     Social connections     Talks on phone: Not on file     Gets together: Not on file     Attends Ep[employer]opalian service: Not on file     Active member of club or organization: Not on file     Attends meetings of clubs or organizations: Not on file     Relationship status: Not on file     Intimate partner violence     Fear of current or ex partner: Not on file     Emotionally abused: Not on file     Physically abused: Not on file     Forced sexual activity: Not on file   Other Topics Concern     Parent/sibling w/ CABG, MI or angioplasty before 65F 55M? Not Asked   Social History Narrative     Not on file            Medications:     Current Outpatient Medications   Medication     albuterol (VENTOLIN HFA) 108 (90 Base) MCG/ACT inhaler     alendronate (FOSAMAX) 70 MG tablet     arformoterol (BROVANA) 15 MCG/2ML NEBU neb solution     budesonide (PULMICORT) 0.5 MG/2ML neb solution     buPROPion (WELLBUTRIN XL) 300 MG 24 hr tablet     levalbuterol (XOPENEX) 1.25 MG/3ML neb solution     omeprazole (PRILOSEC) 40 MG capsule     order for DME     order for DME     tamsulosin (FLOMAX) 0.4 MG capsule     Glycopyrrolate 25 MCG/ML SOLN     No current facility-administered medications for this visit.             Review of Systems:     Answers for HPI/ROS submitted by the patient on 10/19/2020   General Symptoms: Yes  Skin Symptoms: No  HENT  Symptoms: No  EYE SYMPTOMS: Yes  HEART SYMPTOMS: No  LUNG SYMPTOMS: Yes  INTESTINAL SYMPTOMS: No  URINARY SYMPTOMS: Yes  REPRODUCTIVE SYMPTOMS: No  SKELETAL SYMPTOMS: Yes  BLOOD SYMPTOMS: No  NERVOUS SYSTEM SYMPTOMS: No  MENTAL HEALTH SYMPTOMS: Yes  Fever: No  Loss of appetite: No  Weight loss: Yes  Weight gain: No  Fatigue: Yes  Night sweats: No  Chills: No  Increased stress: Yes  Excessive hunger: No  Excessive thirst: No  Feeling hot or cold when others believe the temperature is normal: No  Loss of height: No  Post-operative complications: No  Surgical site pain: No  Hallucinations: No  Change in or Loss of Energy: Yes  Hyperactivity: No  Confusion: No  Eye pain: No  Vision loss: No  Dry eyes: No  Watery eyes: No  Eye bulging: No  Double vision: No  Flashing of lights: No  Spots: Yes  Floaters: No  Redness: No  Crossed eyes: No  Tunnel Vision: No  Yellowing of eyes: No  Eye irritation: No  Cough: Yes  Sputum or phlegm: Yes  Coughing up blood: No  Difficulty breating or shortness of breath: Yes  Snoring: No  Wheezing: Yes  Difficulty breathing on exertion: Yes  Nighttime Cough: Yes  Difficulty breathing when lying flat: Yes  Trouble holding urine or incontinence: Yes  Pain or burning: Yes  Trouble starting or stopping: No  Increased frequency of urination: Yes  Blood in urine: No  Decreased frequency of urination: No  Frequent nighttime urination: Yes  Flank pain: No  Difficulty emptying bladder: Yes  Back pain: Yes  Muscle aches: Yes  Neck pain: Yes  Swollen joints: No  Joint pain: No  Bone pain: Yes  Muscle cramps: Yes  Muscle weakness: No  Joint stiffness: No  Bone fracture: No  Nervous or Anxious: Yes  Depression: Yes  Trouble sleeping: No  Trouble thinking or concentrating: No  Mood changes: No  Panic attacks: No      PHYSICAL EXAM:  /82   Pulse 98   Resp 18   Wt 73.9 kg (163 lb)   SpO2 97%   BMI 23.39 kg/m        General:  Appears winded with talking. Here with a  Walker.   Eyes: Anicteric  Ears:  Hearing grossly normal  Mouth: Oral mucosa is moist, without any lesions. No oropharyngeal exudate.  Neck: supple, no thyromegaly  Lymphatics: No cervical or supraclavicular nodes  Respiratory: Very distant breath sounds.  No wheezing  Cardiac: RRR, normal S1, S2. No murmurs.  Abdomen: Soft, NT/ND  Musculoskeletal: Extremities normal. No clubbing. No cyanosis. No edema.  Skin: No rash on limited exam  Neuro: Normal mentation. Normal speech.  Psych:Normal affect           Data:   All laboratory and imaging data reviewed.      Sputum cultures on January 10, 2020 are positive for Mycobacterium gordonae.  Subsequent sputum cultures on May 26, Jeanette 10, and August 10 are negative for this organism.      I reviewed the chest CT scan images from July 30, 2020.  I agree with radiologist interpretation of advanced centrilobular emphysema with no acute infiltrates.  A 6 x 3 mm nodule in the anterior medial left upper lobe previously measured 9 x 6 mm.  Other bilateral pulmonary nodules are unchanged with the largest measuring 5 mm in the right upper lobe.  No new or enlarging pulmonary nodules.  Calcified granuloma in both lungs.  No mediastinal lymphadenopathy and a normal caliber aorta.    Chest CT scan: February 2018  1. Multiple bilateral pulmonary nodules including a suspicious  spiculated 10 mm nodule in the left upper lobe, consider PET/CT and/or  tissue sampling for further evaluation.  2. 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  3. Diffuse bony demineralization with multiple age indeterminate  presumably pathologic impression fractures of the thoracolumbar spine.

## 2020-12-27 ENCOUNTER — HEALTH MAINTENANCE LETTER (OUTPATIENT)
Age: 70
End: 2020-12-27

## 2021-02-01 ENCOUNTER — TELEPHONE (OUTPATIENT)
Facility: CLINIC | Age: 71
End: 2021-02-01

## 2021-02-01 NOTE — TELEPHONE ENCOUNTER
Prior Authorization Retail Medication Request    Medication/Dose: Maxx MARTÍNEZ agnair Refill kit 25mcg/ml solution  ICD code (if different than what is on RX):    Previously Tried and Failed:    Rationale:      Insurance Name:    Insurance ID:        Pharmacy Information (if different than what is on RX)  Name:  [facility]  Phone:  [phone]

## 2021-02-02 DIAGNOSIS — J44.9 SEVERE CHRONIC OBSTRUCTIVE PULMONARY DISEASE (H): ICD-10-CM

## 2021-02-02 RX ORDER — BUDESONIDE 0.5 MG/2ML
0.5 INHALANT ORAL 2 TIMES DAILY
Qty: 120 ML | Refills: 11 | Status: SHIPPED | OUTPATIENT
Start: 2021-02-02 | End: 2022-01-31

## 2021-02-02 RX ORDER — ARFORMOTEROL TARTRATE 15 UG/2ML
15 SOLUTION RESPIRATORY (INHALATION) 2 TIMES DAILY
Qty: 120 ML | Refills: 11 | Status: SHIPPED | OUTPATIENT
Start: 2021-02-02 | End: 2022-01-31

## 2021-02-02 NOTE — TELEPHONE ENCOUNTER
Prior Authorization Approval    Authorization Effective Date: 11/4/2020  Authorization Expiration Date: 2/2/2022  Medication: Glycopyrrolate (LONHALA MAGNAIR) refill kit 25 MCG/ML SOLN       Approved Dose/Quantity: 60  Reference #:     Insurance Company: [employer] Part D - Phone [phone] Fax [phone]  Expected CoPay:        Which Pharmacy is filling the prescription (Not needed for infusion/clinic administered): [id number] - [place] - [address] [facility]  Pharmacy Notified: Yes - spoke to Tran who says they already were notified and got rx processed prior to my call  Patient Notified: No

## 2021-02-02 NOTE — TELEPHONE ENCOUNTER
Central Prior Authorization Team   Phone: [phone]      PA Initiation    Medication: Glycopyrrolate (LONHALA MAGNAIR) refill kit 25 MCG/ML [place]       Insurance Company: [employer] Part D - Phone [phone] Fax [phone]  Pharmacy Filling the Rx: [id number] - [place] - [address] [facility]  Filling Pharmacy Phone: [phone]  Filling Pharmacy Fax:    Start Date: 2/2/2021

## 2021-02-02 NOTE — TELEPHONE ENCOUNTER
Pt called requesting refills on Glycopyrrolate, Brovana and Pulmicort.  Review of chart indicates PA in process for Glycopyrrolate and refills available for Brovana and pulmicort, however, after reviewing with [facility] Pharmacist, new rx for Brovana and Pulmicort needed for 1 month supply with 11 refills, per medicare guidelines.  New Rx sent as requested.

## 2021-02-25 DIAGNOSIS — J44.9 SEVERE CHRONIC OBSTRUCTIVE PULMONARY DISEASE (H): ICD-10-CM

## 2021-02-25 RX ORDER — ALBUTEROL SULFATE 90 UG/1
2 AEROSOL, METERED RESPIRATORY (INHALATION) EVERY 6 HOURS PRN
Qty: 3 INHALER | Refills: 3 | Status: SHIPPED | OUTPATIENT
Start: 2021-02-25 | End: 2021-12-03

## 2021-04-02 ENCOUNTER — TELEPHONE (OUTPATIENT)
Dept: PULMONOLOGY | Facility: CLINIC | Age: 71
End: 2021-04-02

## 2021-04-12 ENCOUNTER — TRANSFERRED RECORDS (OUTPATIENT)
Dept: HEALTH INFORMATION MANAGEMENT | Facility: CLINIC | Age: 71
End: 2021-04-12

## 2021-04-19 ENCOUNTER — TRANSFERRED RECORDS (OUTPATIENT)
Dept: HEALTH INFORMATION MANAGEMENT | Facility: CLINIC | Age: 71
End: 2021-04-19

## 2021-04-25 ENCOUNTER — HEALTH MAINTENANCE LETTER (OUTPATIENT)
Age: 71
End: 2021-04-25

## 2021-05-10 ENCOUNTER — VIRTUAL VISIT (OUTPATIENT)
Dept: PULMONOLOGY | Facility: CLINIC | Age: 71
End: 2021-05-10
Payer: MEDICARE

## 2021-05-10 DIAGNOSIS — J96.11 HYPOXEMIC RESPIRATORY FAILURE, CHRONIC (H): ICD-10-CM

## 2021-05-10 DIAGNOSIS — J44.9 STAGE 4 VERY SEVERE COPD BY GOLD CLASSIFICATION (H): Primary | ICD-10-CM

## 2021-05-10 PROCEDURE — 99443 PR PHYSICIAN TELEPHONE EVALUATION 21-30 MIN: CPT | Mod: 95

## 2021-05-10 RX ORDER — AZITHROMYCIN 250 MG/1
250 TABLET, FILM COATED ORAL DAILY
COMMUNITY
Start: 2021-04-20 | End: 2023-09-07

## 2021-05-10 NOTE — Clinical Note
5/10/2021         RE: John Yanes  [address] [facility] [address]        Dear Colleague,    Thank you for referring your patient, John Yanes, to the [facility] FOR LUNG SCIENCE AND [facility]. Please see a copy of my visit note below.    John is a 71 year old who is being evaluated via a billable telephone visit.      What phone number would you like to be contacted at? [phone]  How would you like to obtain your AVS? Walk Scorehart  Phone call duration: 25 minutes    [facility]  Pulmonary Medicine  Visit Clinic Note  May 10, 2021         ASSESSMENT & PLAN       Very severe COPD: Advanced emphysema with extreme dyspnea with exertion.  All of his medications are now nebulized, and he is using NIOV to help ambulate at home. He is now off of prednisone, but on azithromycin daily.      His case has been reviewed and transplant committee, and they have deemed him a poor candidate for transplant due to his age, recent bladder cancer, and osteoporosis with vertebral fractures.    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  He has been evaluated by infectious disease.  They consider this a colonizer, and unless he were to grow it again they would not consider treatment of this.    He wanted to discuss endobronchial valves with me today again.  He recently had an evaluation at the [facility].  There were a few potentially reversible relative contraindications including prednisone use, and walk distance.  Carbon dioxide threshold to use may be a little bit more conservative than the one that we typically use here at the [place].  He is concerned that a lot of this testing was done when he was having a bad day, and he did not take all of his medications.  I let him know that he could continue getting the repeat testing at the [facility] to complete the work-up there, or we could  "repeat a few of the testing results here.  We generally have her chest CT scans protocolized for use with Olympus select software.  So he may likely need to repeat the chest CT scan here in addition to a walk test.  I think he is looking for an answer as quick as possible, and does not want to wait a few more months for follow-up at the [facility] if he does not have to.    I told him I would look into timing of getting this chest CT scan repeated, as well as a 6-minute walk test.  We will try to get the pulmonary function testing full results from the [facility].    I again brought up the idea of hospice care for treatment of him and his lung disease.  He states that he is not ready to \"give up.\"     Sebastian Busch MD          Today's visit note:     Chief Complaint: John Yanes is a 70 year old year old male who is being seen for COPD    HISTORY OF PRESENT ILLNESS:    This is a 70-year-old male with a history of COPD and bladder cancer who is presenting for pulmonary follow-up.    He has not had any significant respiratory exacerbation since her last visit.  His urologist referred him to the [facility] for a lung transplant evaluation.  He had that evaluation, and was deemed not a candidate due to his recent bladder cancer.  He was then referred to the COPD clinic for consideration of endobronchial valves.  He was not considered a candidate for valves at this time due to his prednisone use, hypercapnia, and low 6-minute walk time.  He is following up with me today to again talk about endobronchial valves.  He would like to still be considered for these at our institution as well, and to redo any testing that needs to be done in order to pursue this.  None of his other medications have changed aside from restarting azithromycin.         Past Medical and Surgical History:     Past Medical History:   Diagnosis Date     Bladder cancer (H)      Chronic pain      COPD (chronic obstructive pulmonary disease) (H)  "     Depression      GERD (gastroesophageal reflux disease)      High cholesterol      Hypertension      Osteoporosis 2016     Vertebral fracture, osteoporotic (H) 2016    T5     Past Surgical History:   Procedure Laterality Date     AS ESOPHAGOSCOPY, DIAGNOSTIC       AS KNEE SCOPE, DIAGNOSTIC       CYSTOSCOPY       CYSTOSCOPY, TRANSURETHRAL RESECTION (TUR) TUMOR BLADDER, COMBINED       RELEASE CARPAL TUNNEL             Family History:     Family History   Problem Relation Age of Onset     Dementia Mother      Liver Cancer Father      Heart Failure Brother               Social History:     Social History     Socioeconomic History     Marital status:      Spouse name: Not on file     Number of children: Not on file     Years of education: Not on file     Highest education level: Not on file   Occupational History     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     Occupation:      Occupation:    Social Needs     Financial resource strain: Not on file     Food insecurity     Worry: Not on file     Inability: Not on file     Transportation needs     Medical: Not on file     Non-medical: Not on file   Tobacco Use     Smoking status: Former Smoker     Packs/day: 2.00     Years: 35.00     Pack years: 70.00     Types: Cigarettes     Quit date: 2001     Years since quittin.3     Smokeless tobacco: Never Used   Substance and Sexual Activity     Alcohol use: Not on file     Drug use: Yes     Types: Marijuana     Comment: medical use for pain (takes pills)     Sexual activity: Not on file   Lifestyle     Physical activity     Days per week: Not on file     Minutes per session: Not on file     Stress: Not on file   Relationships     Social connections     Talks on phone: Not on file     Gets together: Not on file     Attends Buddhism service: Not on file     Active member of club or organization: Not on file     Attends meetings of clubs or  organizations: Not on file     Relationship status: Not on file     Intimate partner violence     Fear of current or ex partner: Not on file     Emotionally abused: Not on file     Physically abused: Not on file     Forced sexual activity: Not on file   Other Topics Concern     Parent/sibling w/ CABG, MI or angioplasty before 65F 55M? Not Asked   Social History Narrative     Not on file            Medications:     Current Outpatient Medications   Medication     albuterol (VENTOLIN HFA) 108 (90 Base) MCG/ACT inhaler     alendronate (FOSAMAX) 70 MG tablet     arformoterol (BROVANA) 15 MCG/2ML NEBU neb solution     azithromycin (ZITHROMAX) 250 MG tablet     budesonide (PULMICORT) 0.5 MG/2ML neb solution     buPROPion (WELLBUTRIN XL) 300 MG 24 hr tablet     Glycopyrrolate 25 MCG/ML SOLN     levalbuterol (XOPENEX) 1.25 MG/3ML neb solution     omeprazole (PRILOSEC) 40 MG capsule     order for DME     order for DME     tamsulosin (FLOMAX) 0.4 MG capsule     No current facility-administered medications for this visit.             Review of Systems:     All other review of systems are negative    PHYSICAL EXAM:  There were no vitals taken for this visit.  This was a telephone visit      Speaking full sentences, no cough or wheezes audible.           Data:   All laboratory and imaging data reviewed.      Pulmonary function testing from the [facility] demonstrates an FEV1 of 24% predicted, FVC of 71% predicted    Sputum cultures on January 10, 2020 are positive for Mycobacterium gordonae.  Subsequent sputum cultures on May 26, Jeanette 10, and August 10 are negative for this organism.    I reviewed the chest CT scan images from July 30, 2020.  I agree with radiologist interpretation of advanced centrilobular emphysema with no acute infiltrates.  A 6 x 3 mm nodule in the anterior medial left upper lobe previously measured 9 x 6 mm.  Other bilateral pulmonary nodules are unchanged with the largest measuring 5 mm in the right upper  lobe.  No new or enlarging pulmonary nodules.  Calcified granuloma in both lungs.  No mediastinal lymphadenopathy and a normal caliber aorta.    Chest CT scan: February 2018  1. Multiple bilateral pulmonary nodules including a suspicious  spiculated 10 mm nodule in the left upper lobe, consider PET/CT and/or  tissue sampling for further evaluation.  2. 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  3. Diffuse bony demineralization with multiple age indeterminate  presumably pathologic impression fractures of the thoracolumbar spine.                                          Again, thank you for allowing me to participate in the care of your patient.        Sincerely,        Holden Busch MD

## 2021-05-10 NOTE — PROGRESS NOTES
John is a 71 year old who is being evaluated via a billable telephone visit.      What phone number would you like to be contacted at? [phone]  How would you like to obtain your AVS? Almaslissettepadmaja  Phone call duration: 25 minutes    [facility]  Pulmonary Medicine  Visit Clinic Note  May 10, 2021         ASSESSMENT & PLAN       Very severe COPD: Advanced emphysema with extreme dyspnea with exertion.  All of his medications are now nebulized, and he is using NIOV to help ambulate at home. He is now off of prednisone, but on azithromycin daily.      His case has been reviewed and transplant committee, and they have deemed him a poor candidate for transplant due to his age, recent bladder cancer, and osteoporosis with vertebral fractures.    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  He has been evaluated by infectious disease.  They consider this a colonizer, and unless he were to grow it again they would not consider treatment of this.    He wanted to discuss endobronchial valves with me today again.  He recently had an evaluation at the [facility].  There were a few potentially reversible relative contraindications including prednisone use, and walk distance.  Carbon dioxide threshold to use may be a little bit more conservative than the one that we typically use here at the [place].  He is concerned that a lot of this testing was done when he was having a bad day, and he did not take all of his medications.  I let him know that he could continue getting the repeat testing at the [facility] to complete the work-up there, or we could repeat a few of the testing results here.  We generally have her chest CT scans protocolized for use with Olympus select software.  So he may likely need to repeat the chest CT scan here in addition to a walk test.  I think he is looking for an answer as quick as possible, and does  "not want to wait a few more months for follow-up at the [facility] if he does not have to.    I told him I would look into timing of getting this chest CT scan repeated, as well as a 6-minute walk test.  We will try to get the pulmonary function testing full results from the [facility].    I again brought up the idea of hospice care for treatment of him and his lung disease.  He states that he is not ready to \"give up.\"     Sebastian Busch MD          Today's visit note:     Chief Complaint: John Yanes is a 70 year old year old male who is being seen for COPD    HISTORY OF PRESENT ILLNESS:    This is a 70-year-old male with a history of COPD and bladder cancer who is presenting for pulmonary follow-up.    He has not had any significant respiratory exacerbation since her last visit.  His urologist referred him to the [facility] for a lung transplant evaluation.  He had that evaluation, and was deemed not a candidate due to his recent bladder cancer.  He was then referred to the COPD clinic for consideration of endobronchial valves.  He was not considered a candidate for valves at this time due to his prednisone use, hypercapnia, and low 6-minute walk time.  He is following up with me today to again talk about endobronchial valves.  He would like to still be considered for these at our institution as well, and to redo any testing that needs to be done in order to pursue this.  None of his other medications have changed aside from restarting azithromycin.         Past Medical and Surgical History:     Past Medical History:   Diagnosis Date     Bladder cancer (H)      Chronic pain      COPD (chronic obstructive pulmonary disease) (H)      Depression      GERD (gastroesophageal reflux disease)      High cholesterol      Hypertension      Osteoporosis 06/2016     Vertebral fracture, osteoporotic (H) 06/2016    T5     Past Surgical History:   Procedure Laterality Date     AS ESOPHAGOSCOPY, DIAGNOSTIC       AS KNEE " SCOPE, DIAGNOSTIC       CYSTOSCOPY       CYSTOSCOPY, TRANSURETHRAL RESECTION (TUR) TUMOR BLADDER, COMBINED       RELEASE CARPAL TUNNEL             Family History:     Family History   Problem Relation Age of Onset     Dementia Mother      Liver Cancer Father      Heart Failure Brother               Social History:     Social History     Socioeconomic History     Marital status:      Spouse name: Not on file     Number of children: Not on file     Years of education: Not on file     Highest education level: Not on file   Occupational History     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     Occupation:      Occupation:    Social Needs     Financial resource strain: Not on file     Food insecurity     Worry: Not on file     Inability: Not on file     Transportation needs     Medical: Not on file     Non-medical: Not on file   Tobacco Use     Smoking status: Former Smoker     Packs/day: 2.00     Years: 35.00     Pack years: 70.00     Types: Cigarettes     Quit date: 2001     Years since quittin.3     Smokeless tobacco: Never Used   Substance and Sexual Activity     Alcohol use: Not on file     Drug use: Yes     Types: Marijuana     Comment: medical use for pain (takes pills)     Sexual activity: Not on file   Lifestyle     Physical activity     Days per week: Not on file     Minutes per session: Not on file     Stress: Not on file   Relationships     Social connections     Talks on phone: Not on file     Gets together: Not on file     Attends Rastafari service: Not on file     Active member of club or organization: Not on file     Attends meetings of clubs or organizations: Not on file     Relationship status: Not on file     Intimate partner violence     Fear of current or ex partner: Not on file     Emotionally abused: Not on file     Physically abused: Not on file     Forced sexual activity: Not on file   Other Topics Concern      Parent/sibling w/ CABG, MI or angioplasty before 65F 55M? Not Asked   Social History Narrative     Not on file            Medications:     Current Outpatient Medications   Medication     albuterol (VENTOLIN HFA) 108 (90 Base) MCG/ACT inhaler     alendronate (FOSAMAX) 70 MG tablet     arformoterol (BROVANA) 15 MCG/2ML NEBU neb solution     azithromycin (ZITHROMAX) 250 MG tablet     budesonide (PULMICORT) 0.5 MG/2ML neb solution     buPROPion (WELLBUTRIN XL) 300 MG 24 hr tablet     Glycopyrrolate 25 MCG/ML SOLN     levalbuterol (XOPENEX) 1.25 MG/3ML neb solution     omeprazole (PRILOSEC) 40 MG capsule     order for DME     order for DME     tamsulosin (FLOMAX) 0.4 MG capsule     No current facility-administered medications for this visit.             Review of Systems:     All other review of systems are negative    PHYSICAL EXAM:  There were no vitals taken for this visit.  This was a telephone visit      Speaking full sentences, no cough or wheezes audible.           Data:   All laboratory and imaging data reviewed.      Pulmonary function testing from the [facility] demonstrates an FEV1 of 24% predicted, FVC of 71% predicted    Sputum cultures on January 10, 2020 are positive for Mycobacterium gordonae.  Subsequent sputum cultures on May 26, Jeanette 10, and August 10 are negative for this organism.    I reviewed the chest CT scan images from July 30, 2020.  I agree with radiologist interpretation of advanced centrilobular emphysema with no acute infiltrates.  A 6 x 3 mm nodule in the anterior medial left upper lobe previously measured 9 x 6 mm.  Other bilateral pulmonary nodules are unchanged with the largest measuring 5 mm in the right upper lobe.  No new or enlarging pulmonary nodules.  Calcified granuloma in both lungs.  No mediastinal lymphadenopathy and a normal caliber aorta.    Chest CT scan: February 2018  1. Multiple bilateral pulmonary nodules including a suspicious  spiculated 10 mm nodule in the left  upper lobe, consider PET/CT and/or  tissue sampling for further evaluation.  2. 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  3. Diffuse bony demineralization with multiple age indeterminate  presumably pathologic impression fractures of the thoracolumbar spine.

## 2021-05-10 NOTE — Clinical Note
I placed the order for the Olympus Chest CT.  Can you also schedule another 6 minute walk for him.  Finally, I need the paper copy of his pulmonary function testing from the [facility] recently.  I see the numbers in care everywhere, but I need to see the paper copy of the test.  Can you let me know when it gets faxed in ?    Sebastian

## 2021-05-13 ENCOUNTER — ANCILLARY PROCEDURE (OUTPATIENT)
Dept: CT IMAGING | Facility: CLINIC | Age: 71
End: 2021-05-13
Payer: MEDICARE

## 2021-05-13 DIAGNOSIS — J44.9 STAGE 4 VERY SEVERE COPD BY GOLD CLASSIFICATION (H): ICD-10-CM

## 2021-05-13 LAB
6 MIN WALK (FT): 675 FT
6 MIN WALK (M): 206 M

## 2021-05-13 PROCEDURE — 94618 PULMONARY STRESS TESTING: CPT

## 2021-05-13 PROCEDURE — 71250 CT THORAX DX C-: CPT | Mod: MG | Performed by: RADIOLOGY

## 2021-05-13 PROCEDURE — G1004 CDSM NDSC: HCPCS | Mod: GC | Performed by: RADIOLOGY

## 2021-05-17 NOTE — COMMITTEE REVIEW
Thoracic Patient Discussion Note Transplant Coordinator: Davina Bettencourt  Transplant Surgeon:  Leah/Dallas     Referring Physician: Holden Busch    Committee Review Members:  Nutrition Marylin Mata RD   Pulmonary Disease Tayler Claudio RD, [place] Mr Yolanda MD, Socrates Rosenberg MD, Eduard Rosenberg MD, Katherine Jackman MD    - Clinical RIN Reyes, Rocío Castro, [facility]   Transplant Laya Sims, RN, Davina Bettencourt, RN, Naye John, RN, Chitra Levy, RN, Sarahi Dominguez, RN, Emily Ferro, RN, Tracy Santos, RN, VIRGINIA CHRISTIANSON RN   Transplant Surgery Lito Childers MD, Alisha Lynn MD, Kumar Haynes MD       Additional Discussion Notes and Findings: Patient referred to lung transplant program by Dr Busch. Noted to have a recent bladder cancer (less than five years post negative diagnosis); osteoporosis with history of vertebral fracture, chronic pain, age greater than 70 at time of referral.     Team determined patient had too many relative contraindications ruling him out for consideration of lung transplantation.     Referral closed, Dr Guerrero to follow up with Dr Busch with teams discussion outcomes.

## 2021-06-02 ENCOUNTER — MYC MEDICAL ADVICE (OUTPATIENT)
Dept: PULMONOLOGY | Facility: CLINIC | Age: 71
End: 2021-06-02

## 2021-10-09 ENCOUNTER — HEALTH MAINTENANCE LETTER (OUTPATIENT)
Age: 71
End: 2021-10-09

## 2021-10-18 DIAGNOSIS — J44.9 SEVERE CHRONIC OBSTRUCTIVE PULMONARY DISEASE (H): ICD-10-CM

## 2021-10-18 RX ORDER — LEVALBUTEROL INHALATION SOLUTION 1.25 MG/3ML
1 SOLUTION RESPIRATORY (INHALATION) EVERY 6 HOURS PRN
Qty: 540 ML | Refills: 4 | Status: SHIPPED | OUTPATIENT
Start: 2021-10-18 | End: 2022-02-08

## 2021-11-19 DIAGNOSIS — J44.9 STAGE 4 VERY SEVERE COPD BY GOLD CLASSIFICATION (H): ICD-10-CM

## 2021-12-03 DIAGNOSIS — J44.9 SEVERE CHRONIC OBSTRUCTIVE PULMONARY DISEASE (H): ICD-10-CM

## 2021-12-03 RX ORDER — ALBUTEROL SULFATE 90 UG/1
2 AEROSOL, METERED RESPIRATORY (INHALATION) EVERY 6 HOURS PRN
Qty: 54 G | Refills: 3 | Status: SHIPPED | OUTPATIENT
Start: 2021-12-03 | End: 2022-11-23

## 2022-01-31 ENCOUNTER — MYC MEDICAL ADVICE (OUTPATIENT)
Dept: PULMONOLOGY | Facility: CLINIC | Age: 72
End: 2022-01-31
Payer: MEDICARE

## 2022-01-31 DIAGNOSIS — J44.9 SEVERE CHRONIC OBSTRUCTIVE PULMONARY DISEASE (H): ICD-10-CM

## 2022-01-31 RX ORDER — BUDESONIDE 0.5 MG/2ML
0.5 INHALANT ORAL 2 TIMES DAILY
Qty: 120 ML | Refills: 11 | Status: SHIPPED | OUTPATIENT
Start: 2022-01-31 | End: 2022-02-01

## 2022-01-31 RX ORDER — ARFORMOTEROL TARTRATE 15 UG/2ML
15 SOLUTION RESPIRATORY (INHALATION) 2 TIMES DAILY
Qty: 120 ML | Refills: 11 | Status: SHIPPED | OUTPATIENT
Start: 2022-01-31 | End: 2023-01-23

## 2022-02-01 RX ORDER — BUDESONIDE 0.5 MG/2ML
0.5 INHALANT ORAL 2 TIMES DAILY
Qty: 120 ML | Refills: 11 | Status: SHIPPED | OUTPATIENT
Start: 2022-02-01 | End: 2023-01-23

## 2022-02-09 ENCOUNTER — TELEPHONE (OUTPATIENT)
Facility: CLINIC | Age: 72
End: 2022-02-09
Payer: MEDICARE

## 2022-02-09 NOTE — CONFIDENTIAL NOTE
Prior Authorization Retail Medication Request    Medication/Dose: Levalbuterol HCL 1.25MG/3ML Neb  ICD code (if different than what is on RX):    Previously Tried and Failed:    Rationale:      Insurance Name:    Insurance ID:        Pharmacy Information (if different than what is on RX)  Name:  [facility]  Phone:  [phone]

## 2022-02-11 NOTE — TELEPHONE ENCOUNTER
Central Prior Authorization Team   Phone: [phone]      Unable to complete P/A request on Cover My Meds, as it is not able to process this request through ePA. I have downloaded a P/A form and will complete and fax in.      PA Initiation    Medication: Levalbuterol HCL 1.25MG/3ML Neb  Insurance Company: [employer] - Phone [phone] Fax [phone]  Pharmacy Filling the Rx: [id number] - [place] - [address]  Filling Pharmacy Phone: [phone]  Filling Pharmacy Fax:    Start Date: 2/11/2022

## 2022-02-14 NOTE — TELEPHONE ENCOUNTER
PRIOR AUTHORIZATION DENIED    Medication: Levalbuterol HCL 1.25MG/3ML Neb- PA DENIED     Denial Date: 2/13/2022    Denial Rational:       Appeal Information:

## 2022-02-14 NOTE — TELEPHONE ENCOUNTER
Contacted pharmacy to run Rx under part B, per denial letter.  Pharmacist agrees and will call back if not approved.

## 2022-02-25 ENCOUNTER — TELEPHONE (OUTPATIENT)
Facility: CLINIC | Age: 72
End: 2022-02-25
Payer: MEDICARE

## 2022-02-25 NOTE — CONFIDENTIAL NOTE
Prior Authorization Retail Medication Request    Medication/Dose: Lonhala Magnair Refill kit 25 Soln  ICD code (if different than what is on RX):    Previously Tried and Failed:    Rationale:      Insurance Name:    Insurance ID:        Pharmacy Information (if different than what is on RX)  Name:    Phone:

## 2022-02-26 NOTE — TELEPHONE ENCOUNTER
Prior Authorization Approval    Authorization Effective Date: 11/28/2021  Authorization Expiration Date: 2/26/2023  Medication: Glycopyrrolate (LONHALA MAGNAIR REFILL KIT) 25 MCG/ML SOLN--APPROVED  Approved Dose/Quantity:   Reference #:     Insurance Company: [employer] Part D - Phone [phone] Fax [phone]  Expected CoPay:       CoPay Card Available:      Foundation Assistance Needed:    Which Pharmacy is filling the prescription (Not needed for infusion/clinic administered): [id number] - [place] - [address]  Pharmacy Notified: Yes  Patient Notified: Yes **Instructed pharmacy to notify patient when script is ready to /ship.**

## 2022-02-26 NOTE — TELEPHONE ENCOUNTER
PA Initiation    Medication: Glycopyrrolate (LONHALA MAGNAIR REFILL KIT) 25 MCG/ML SOLN   Insurance Company: [employer] Part D - Phone [phone] Fax [phone]  Pharmacy Filling the Rx: [id number] - [place] - [address]  Filling Pharmacy Phone: [phone]  Filling Pharmacy Fax: [phone]  Start Date: 2/26/2022

## 2022-04-04 ENCOUNTER — TELEPHONE (OUTPATIENT)
Dept: PULMONOLOGY | Facility: CLINIC | Age: 72
End: 2022-04-04
Payer: MEDICARE

## 2022-04-04 NOTE — TELEPHONE ENCOUNTER
Received communication from Theresa from [facility] requesting most recent office notes be faxed to [facility] for their documentation. Fax cover sheet, demographic sheet and most recent office notes (From May 2021) faxed to [phone].

## 2022-05-21 ENCOUNTER — HEALTH MAINTENANCE LETTER (OUTPATIENT)
Age: 72
End: 2022-05-21

## 2022-08-01 ASSESSMENT — ENCOUNTER SYMPTOMS
HEMOPTYSIS: 0
POLYDIPSIA: 0
WEIGHT LOSS: 0
RECTAL PAIN: 0
JAUNDICE: 0
HEARTBURN: 1
DYSURIA: 0
WEIGHT GAIN: 0
BLOATING: 0
EYE PAIN: 0
WHEEZING: 0
FLANK PAIN: 0
DOUBLE VISION: 1
VOMITING: 0
ALTERED TEMPERATURE REGULATION: 0
STIFFNESS: 0
DECREASED APPETITE: 0
EYE REDNESS: 0
MUSCLE CRAMPS: 1
SNORES LOUDLY: 0
MUSCLE WEAKNESS: 1
BOWEL INCONTINENCE: 0
DIFFICULTY URINATING: 0
ABDOMINAL PAIN: 1
HALLUCINATIONS: 0
MYALGIAS: 1
COUGH DISTURBING SLEEP: 0
NIGHT SWEATS: 0
SHORTNESS OF BREATH: 1
FEVER: 0
POLYPHAGIA: 0
INCREASED ENERGY: 0
JOINT SWELLING: 0
CONSTIPATION: 0
SPUTUM PRODUCTION: 1
BACK PAIN: 1
EYE IRRITATION: 1
BLOOD IN STOOL: 0
DIARRHEA: 0
EYE WATERING: 0
DYSPNEA ON EXERTION: 1
FATIGUE: 1
COUGH: 1
POSTURAL DYSPNEA: 0
NECK PAIN: 1
HEMATURIA: 0
ARTHRALGIAS: 1
CHILLS: 0
NAUSEA: 0

## 2022-08-08 ENCOUNTER — OFFICE VISIT (OUTPATIENT)
Dept: PULMONOLOGY | Facility: CLINIC | Age: 72
End: 2022-08-08
Payer: MEDICARE

## 2022-08-08 ENCOUNTER — TRANSFERRED RECORDS (OUTPATIENT)
Dept: HEALTH INFORMATION MANAGEMENT | Facility: CLINIC | Age: 72
End: 2022-08-08

## 2022-08-08 VITALS
BODY MASS INDEX: 24.59 KG/M2 | DIASTOLIC BLOOD PRESSURE: 81 MMHG | HEIGHT: 69 IN | SYSTOLIC BLOOD PRESSURE: 138 MMHG | OXYGEN SATURATION: 97 % | HEART RATE: 96 BPM | WEIGHT: 166 LBS

## 2022-08-08 DIAGNOSIS — J44.9 STAGE 4 VERY SEVERE COPD BY GOLD CLASSIFICATION (H): Primary | ICD-10-CM

## 2022-08-08 DIAGNOSIS — J44.9 STAGE 4 VERY SEVERE COPD BY GOLD CLASSIFICATION (H): ICD-10-CM

## 2022-08-08 DIAGNOSIS — J96.11 HYPOXEMIC RESPIRATORY FAILURE, CHRONIC (H): ICD-10-CM

## 2022-08-08 DIAGNOSIS — J43.2 CENTRILOBULAR EMPHYSEMA (H): ICD-10-CM

## 2022-08-08 LAB
6 MIN WALK (FT): 600 FT
6 MIN WALK (FT): 780 FT
6 MIN WALK (M): 183 M
6 MIN WALK (M): 238 M

## 2022-08-08 PROCEDURE — 94618 PULMONARY STRESS TESTING: CPT | Performed by: INTERNAL MEDICINE

## 2022-08-08 PROCEDURE — 99214 OFFICE O/P EST MOD 30 MIN: CPT | Mod: 25

## 2022-08-08 PROCEDURE — G0463 HOSPITAL OUTPT CLINIC VISIT: HCPCS | Mod: 25

## 2022-08-08 ASSESSMENT — PAIN SCALES - GENERAL: PAINLEVEL: MODERATE PAIN (4)

## 2022-08-08 NOTE — NURSING NOTE
Chief Complaint   Patient presents with     Follow Up     Follow up      Vitals were taken and medications were reconciled.     Kerri Shelton RMA  1:29 PM

## 2022-08-08 NOTE — LETTER
8/8/2022         RE: John Yanes  [address]  [place] [address]        Dear Colleague,    Thank you for referring your patient, John Yanes, to the [facility] FOR LUNG SCIENCE AND [facility]. Please see a copy of my visit note below.    [facility]  Pulmonary Medicine  Visit Clinic Note  August 8, 2022         ASSESSMENT & PLAN       Very Severe COPD   Chronic Hypoxemic and hypercapnic Respiratory Failure  Emphysema     His lung disease seems to be progressing.  He is also more physically deconditioned.  Today, which showed that he is hypoxemic on room air, and his oxygen saturation improves with 2 L nasal cannula.  He should use 2 L of oxygen at rest and with exertion.  He should also use 2 L of oxygen at night.  I will get an overnight oximetry test for him on 2 L.  We talked about pulmonary rehab, but he is not interested in participating in that.  Unfortunately he is not a lung transplant candidate due to medical comorbidities of previous bladder cancer and osteoporosis.  His age also would come into play.  I have previously worked him up for endobronchial valve placement, but he did not have anatomy at that time that would be suitable for valves.  All of his COPD medications are fully nebulized at the moment.  He does take a daily azithromycin.  I encouraged him to try to remain as active as possible to keep his physical health intact.    Return to clinic in 1 year.     Sebastian Busch MD     I spent 33 minutes minutes on the date of the encounter doing chart review, history and exam, documentation and discussing oxygen use and COPD therapies    I certify that this patient, John Yanes has been under my care (or a nurse practitioner or physican's assistant working with me). This is the face-to-face encounter for oxygen medical necessity.      John Yanes is now in a chronic stable state and continues to require supplemental oxygen. Patient has  continued oxygen desaturation due to Chronic Respiratory Failure with Hypoxia J96.11  COPD J44.9.    Alternative treatment(s) tried or considered and deemed clinically infective for treatment of Chronic Respiratory Failure with Hypoxia J96.11  COPD J44.9 include nebulizers.  If portability is ordered, is the patient mobile within the home? yes    **Patients who qualify for home O2 coverage under the CMS guidelines require ABG tests or O2 sat readings obtained closest to, but no earlier than 2 days prior to the discharge, as evidence of the need for home oxygen therapy. Testing must be performed while patient is in the chronic stable state. See notes for O2 sats.**           Today's visit note:     Chief Complaint: Shortness of breath    HISTORY OF PRESENT ILLNESS:    This is a 72-year-old male with a history of very severe COPD with chronic hypoxemic and hypercapnic respiratory failure the presents to the clinic today to discuss oxygen use and shortness of breath.    He is on fully nebulized medications.  He takes Xopenex in the morning.  He follows that with Pulmicort, Brovana, and glycopyrrolate.  He uses Xopenex throughout the day to help with symptoms.  He takes daily azithromycin.  He is unsure if the glycopyrrolate is really making a difference or not but he continues to take it.  He is pretty frustrated with the $70 monthly co-pay on it though.  He has been treated for a COPD exacerbation at least once over the last year with prednisone.  He says prednisone does help when it happens.  His activity is very limited.  He gets quite dyspneic with limited amounts of exertion.  He has participated in pulmonary rehab in the past, but he is not interested in reenrolling at the moment.  He is using 2 to 3 L of oxygen during the day and night.  Mild cough with rare sputum production             Past Medical and Surgical History:     Past Medical History:   Diagnosis Date     Bladder cancer (H)      Chronic pain       COPD (chronic obstructive pulmonary disease) (H)      Depression      GERD (gastroesophageal reflux disease)      High cholesterol      Hypertension      Osteoporosis 2016     Vertebral fracture, osteoporotic (H) 2016    T5     Past Surgical History:   Procedure Laterality Date     AS ESOPHAGOSCOPY, DIAGNOSTIC       AS KNEE SCOPE, DIAGNOSTIC       CYSTOSCOPY       CYSTOSCOPY, TRANSURETHRAL RESECTION (TUR) TUMOR BLADDER, COMBINED       RELEASE CARPAL TUNNEL             Family History:     Family History   Problem Relation Age of Onset     Dementia Mother      Liver Cancer Father      Heart Failure Brother               Social History:     Social History     Socioeconomic History     Marital status:      Spouse name: Not on file     Number of children: Not on file     Years of education: Not on file     Highest education level: Not on file   Occupational History     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     Occupation:      Occupation:    Tobacco Use     Smoking status: Former Smoker     Packs/day: 2.00     Years: 35.00     Pack years: 70.00     Types: Cigarettes     Quit date: 2001     Years since quittin.6     Smokeless tobacco: Never Used   Substance and Sexual Activity     Alcohol use: Not on file     Drug use: Yes     Types: Marijuana     Comment: medical use for pain (takes pills)     Sexual activity: Not on file   Other Topics Concern     Parent/sibling w/ CABG, MI or angioplasty before 65F 55M? Not Asked   Social History Narrative     Not on file     Social Determinants of Health     Financial Resource Strain: Not on file   Food Insecurity: Not on file   Transportation Needs: Not on file   Physical Activity: Not on file   Stress: Not on file   Social Connections: Not on file   Intimate Partner Violence: Not on file   Housing Stability: Not on file            Medications:     Current Outpatient Medications   Medication      albuterol (VENTOLIN HFA) 108 (90 Base) MCG/ACT inhaler     alendronate (FOSAMAX) 70 MG tablet     arformoterol (BROVANA) 15 MCG/2ML NEBU neb solution     azithromycin (ZITHROMAX) 250 MG tablet     budesonide (PULMICORT) 0.5 MG/2ML neb solution     buPROPion (WELLBUTRIN XL) 300 MG 24 hr tablet     Glycopyrrolate 25 MCG/ML SOLN     levalbuterol (XOPENEX) 1.25 MG/3ML neb solution     omeprazole (PRILOSEC) 40 MG capsule     order for DME     order for DME     tamsulosin (FLOMAX) 0.4 MG capsule     No current facility-administered medications for this visit.            Review of Systems:       Answers for HPI/ROS submitted by the patient on 8/1/2022  General Symptoms: Yes  Skin Symptoms: No  HENT Symptoms: No  EYE SYMPTOMS: Yes  HEART SYMPTOMS: No  LUNG SYMPTOMS: Yes  INTESTINAL SYMPTOMS: Yes  URINARY SYMPTOMS: Yes  REPRODUCTIVE SYMPTOMS: Yes  SKELETAL SYMPTOMS: Yes  BLOOD SYMPTOMS: No  NERVOUS SYSTEM SYMPTOMS: No  MENTAL HEALTH SYMPTOMS: No  Fever: No  Loss of appetite: No  Weight loss: No  Weight gain: No  Fatigue: Yes  Night sweats: No  Chills: No  Increased stress: No  Excessive hunger: No  Excessive thirst: No  Feeling hot or cold when others believe the temperature is normal: No  Loss of height: No  Post-operative complications: No  Surgical site pain: No  Hallucinations: No  Change in or Loss of Energy: No  Hyperactivity: No  Confusion: No  Eye pain: No  Vision loss: Yes  Dry eyes: No  Watery eyes: No  Eye bulging: No  Double vision: Yes  Flashing of lights: Yes  Spots: Yes  Floaters: Yes  Redness: No  Crossed eyes: No  Tunnel Vision: No  Yellowing of eyes: No  Eye irritation: Yes  Cough: Yes  Sputum or phlegm: Yes  Coughing up blood: No  Difficulty breating or shortness of breath: Yes  Snoring: No  Wheezing: No  Difficulty breathing on exertion: Yes  Nighttime Cough: No  Difficulty breathing when lying flat: No  Heart burn or indigestion: Yes  Nausea: No  Vomiting: No  Abdominal pain: Yes  Bloating:  "No  Constipation: No  Diarrhea: No  Blood in stool: No  Black stools: No  Rectal or Anal pain: No  Fecal incontinence: No  Yellowing of skin or eyes: No  Vomit with blood: No  Change in stools: No  Trouble holding urine or incontinence: Yes  Pain or burning: No  Trouble starting or stopping: Yes  Increased frequency of urination: Yes  Blood in urine: No  Decreased frequency of urination: No  Frequent nighttime urination: Yes  Flank pain: No  Difficulty emptying bladder: No  Scrotal pain or swelling: No  Erectile dysfunction: Yes  Penile discharge: No  Genital ulcers: No  Reduced libido: Yes  Back pain: Yes  Muscle aches: Yes  Neck pain: Yes  Swollen joints: No  Joint pain: Yes  Bone pain: Yes  Muscle cramps: Yes  Muscle weakness: Yes  Joint stiffness: No  Bone fracture: No          PHYSICAL EXAM:  /81 (BP Location: Right arm, Cuff Size: Adult Regular)   Pulse 96   Ht 1.753 m (5' 9\")   Wt 75.3 kg (166 lb)   SpO2 97%   BMI 24.51 kg/m       Resting room air saturation is 86% and improves to 97% with 2 L nasal cannula.  General: No distress  Eyes: Anicteric  Ears: Hearing grossly normal  Mouth: Oral mucosa is moist, without any lesions. No oropharyngeal exudate.  Neck: supple, no thyromegaly  Lymphatics: No cervical or supraclavicular nodes  Respiratory: Quiet breath sounds, no wheezing or crackles  Cardiac: RRR, normal S1, S2. No murmurs.   Abdomen: Soft, NT/ND  Musculoskeletal: Extremities normal. No clubbing. No cyanosis. No edema.  Skin: No rash on limited exam  Neuro: Normal mentation. Normal speech.  Psych:Normal affect           Data:   All laboratory and imaging data reviewed.      PFT:   Spirometry performed in April 2021 shows an FEV1/FVC ratio of 0.25.  The FVC is 2.96 L which is 71% predicted.  The FEV1 is 0.72 L which is 24% predicted.  The total lung capacity is 170% predicted.  Residual volume is 292% predicted.     PFT Interpretation:  Very severe airflow obstruction.  Gas trapping and " hyperinflation  Valid Maneuver    He had a 6-minute walk test performed today.  His resting room air saturation is 86%.  While on 2 L nasal cannula his oxygen saturation aristides to 96%.  Using 2 L/min pulsed dose oxygen he performed a 6-minute walk.  His oxygen saturation dropped to as low as 89% during the walk.  He described 7 out of 10 dyspnea on the Yu scale at the end of the walk.  He walked 600 feet      Chest CT: I have reviewed the chest CT images from May 2021 and agree with the radiologist interpretation below:  Lungs: Tracheal secretions/debris in the trachea. Severe paraseptal  and centrilobular emphysematous changes. The lungs are hyperinflated.  Biapical scarring. No pneumothorax, pleural effusion, or focal  consolidation. Multiple bilateral pulmonary nodules, including (all  series 4):  - Stable 4 mm solid pulmonary nodule of the posterior left upper lobe  (image 57)  - Stable 12 x 5 mm partially calcified nodule of the left upper lobe  (image 87)  - Stable 7 mm solid pulmonary nodule of the right upper lobe (image  61)     No new or enlarging pulmonary nodules. Several scattered calcified  granulomas.     Mediastinum: The visualized thyroid gland is unremarkable. Right chest  wall Port-A-Cath tip in the low SVC. Atherosclerotic calcifications of  the great vessels and aortic arch. The heart is not enlarged. No  significant pericardial effusion. Coronary artery calcifications. The  ascending aorta and main pulmonary artery are normal in caliber. No  mediastinal or axillary lymphadenopathy.     Upper abdomen: Calcifications of the pancreatic parenchyma. 5 mm right  renal calculi.     Bones/soft tissues: Degenerative changes of the spine. No acute  osseous lesions.                                                                      IMPRESSION:   1. Severe paraseptal and centrilobular emphysematous changes of the  lungs.  2. Multiple bilateral pulmonary nodules are stable dating back to  2018, including  a partially calcified 12 x 5 mm nodule of the left  upper lobe.  5. Partially visualized 5 mm right renal calculi.                        DME (Durable Medical Equipment) Orders and Documentation  Orders Placed This Encounter   Procedures     Oxygen Order      The patient was assessed and it was determined the patient is in need of the following listed DME Supplies/Equipment. Please complete supporting documentation below to demonstrate medical necessity.      Oxygen Use Documentation  I certify that this patient, John Yanes has been under my care and that I, or a nurse practitioner or physician's assistant working with me, had a face-to-face encounter that meets face-to-face encounter requirements with this patient on August 9, 2022.    John Yanes is now in a chronic stable state and continues to require supplemental oxygen due to continued oxygen desaturation.  This patient has been treated in part, or in whole for the following medical condition(s):  Chronic Respiratory Failure with Hypoxia J96.11  COPD J44.9  Treatments tried and failed or ruled out to treat hypoxemia include nebulizers.  If portability is ordered, is the patient mobile within the home? yes          Again, thank you for allowing me to participate in the care of your patient.        Sincerely,        Holden Busch MD

## 2022-08-08 NOTE — PROGRESS NOTES
[facility]  Pulmonary Medicine  Visit Clinic Note  August 8, 2022         ASSESSMENT & PLAN       Very Severe COPD   Chronic Hypoxemic and hypercapnic Respiratory Failure  Emphysema     His lung disease seems to be progressing.  He is also more physically deconditioned.  Today, which showed that he is hypoxemic on room air, and his oxygen saturation improves with 2 L nasal cannula.  He should use 2 L of oxygen at rest and with exertion.  He should also use 2 L of oxygen at night.  I will get an overnight oximetry test for him on 2 L.  We talked about pulmonary rehab, but he is not interested in participating in that.  Unfortunately he is not a lung transplant candidate due to medical comorbidities of previous bladder cancer and osteoporosis.  His age also would come into play.  I have previously worked him up for endobronchial valve placement, but he did not have anatomy at that time that would be suitable for valves.  All of his COPD medications are fully nebulized at the moment.  He does take a daily azithromycin.  I encouraged him to try to remain as active as possible to keep his physical health intact.    I am also ordering a new nebulizer for him. His current one is not working effectively.  All of his medications are nebulized (xopenex, budesonide, brovana).     Return to clinic in 1 year.     Sebastian Busch MD     I spent 33 minutes minutes on the date of the encounter doing chart review, history and exam, documentation and discussing oxygen use and COPD therapies    I certify that this patient, John Yanes has been under my care (or a nurse practitioner or physican's assistant working with me). This is the face-to-face encounter for oxygen medical necessity.      John Yanes is now in a chronic stable state and continues to require supplemental oxygen. Patient has continued oxygen desaturation due to Chronic Respiratory Failure with Hypoxia J96.11  COPD J44.9.    Alternative  treatment(s) tried or considered and deemed clinically infective for treatment of Chronic Respiratory Failure with Hypoxia J96.11  COPD J44.9 include nebulizers.  If portability is ordered, is the patient mobile within the home? yes    **Patients who qualify for home O2 coverage under the CMS guidelines require ABG tests or O2 sat readings obtained closest to, but no earlier than 2 days prior to the discharge, as evidence of the need for home oxygen therapy. Testing must be performed while patient is in the chronic stable state. See notes for O2 sats.**           Today's visit note:     Chief Complaint: Shortness of breath    HISTORY OF PRESENT ILLNESS:    This is a 72-year-old male with a history of very severe COPD with chronic hypoxemic and hypercapnic respiratory failure the presents to the clinic today to discuss oxygen use and shortness of breath.    He is on fully nebulized medications.  He takes Xopenex in the morning.  He follows that with Pulmicort, Brovana, and glycopyrrolate.  He uses Xopenex throughout the day to help with symptoms.  He takes daily azithromycin.  He is unsure if the glycopyrrolate is really making a difference or not but he continues to take it.  He is pretty frustrated with the $70 monthly co-pay on it though.  He has been treated for a COPD exacerbation at least once over the last year with prednisone.  He says prednisone does help when it happens.  His activity is very limited.  He gets quite dyspneic with limited amounts of exertion.  He has participated in pulmonary rehab in the past, but he is not interested in reenrolling at the moment.  He is using 2 to 3 L of oxygen during the day and night.  Mild cough with rare sputum production             Past Medical and Surgical History:     Past Medical History:   Diagnosis Date     Bladder cancer (H)      Chronic pain      COPD (chronic obstructive pulmonary disease) (H)      Depression      GERD (gastroesophageal reflux disease)       High cholesterol      Hypertension      Osteoporosis 2016     Vertebral fracture, osteoporotic (H) 2016    T5     Past Surgical History:   Procedure Laterality Date     AS ESOPHAGOSCOPY, DIAGNOSTIC       AS KNEE SCOPE, DIAGNOSTIC       CYSTOSCOPY       CYSTOSCOPY, TRANSURETHRAL RESECTION (TUR) TUMOR BLADDER, COMBINED       RELEASE CARPAL TUNNEL             Family History:     Family History   Problem Relation Age of Onset     Dementia Mother      Liver Cancer Father      Heart Failure Brother               Social History:     Social History     Socioeconomic History     Marital status:      Spouse name: Not on file     Number of children: Not on file     Years of education: Not on file     Highest education level: Not on file   Occupational History     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     Occupation:      Occupation:    Tobacco Use     Smoking status: Former Smoker     Packs/day: 2.00     Years: 35.00     Pack years: 70.00     Types: Cigarettes     Quit date: 2001     Years since quittin.6     Smokeless tobacco: Never Used   Substance and Sexual Activity     Alcohol use: Not on file     Drug use: Yes     Types: Marijuana     Comment: medical use for pain (takes pills)     Sexual activity: Not on file   Other Topics Concern     Parent/sibling w/ CABG, MI or angioplasty before 65F 55M? Not Asked   Social History Narrative     Not on file     Social Determinants of Health     Financial Resource Strain: Not on file   Food Insecurity: Not on file   Transportation Needs: Not on file   Physical Activity: Not on file   Stress: Not on file   Social Connections: Not on file   Intimate Partner Violence: Not on file   Housing Stability: Not on file            Medications:     Current Outpatient Medications   Medication     albuterol (VENTOLIN HFA) 108 (90 Base) MCG/ACT inhaler     alendronate (FOSAMAX) 70 MG tablet     arformoterol  (BROVANA) 15 MCG/2ML NEBU neb solution     azithromycin (ZITHROMAX) 250 MG tablet     budesonide (PULMICORT) 0.5 MG/2ML neb solution     buPROPion (WELLBUTRIN XL) 300 MG 24 hr tablet     Glycopyrrolate 25 MCG/ML SOLN     levalbuterol (XOPENEX) 1.25 MG/3ML neb solution     omeprazole (PRILOSEC) 40 MG capsule     order for DME     order for DME     tamsulosin (FLOMAX) 0.4 MG capsule     No current facility-administered medications for this visit.            Review of Systems:       Answers for HPI/ROS submitted by the patient on 8/1/2022  General Symptoms: Yes  Skin Symptoms: No  HENT Symptoms: No  EYE SYMPTOMS: Yes  HEART SYMPTOMS: No  LUNG SYMPTOMS: Yes  INTESTINAL SYMPTOMS: Yes  URINARY SYMPTOMS: Yes  REPRODUCTIVE SYMPTOMS: Yes  SKELETAL SYMPTOMS: Yes  BLOOD SYMPTOMS: No  NERVOUS SYSTEM SYMPTOMS: No  MENTAL HEALTH SYMPTOMS: No  Fever: No  Loss of appetite: No  Weight loss: No  Weight gain: No  Fatigue: Yes  Night sweats: No  Chills: No  Increased stress: No  Excessive hunger: No  Excessive thirst: No  Feeling hot or cold when others believe the temperature is normal: No  Loss of height: No  Post-operative complications: No  Surgical site pain: No  Hallucinations: No  Change in or Loss of Energy: No  Hyperactivity: No  Confusion: No  Eye pain: No  Vision loss: Yes  Dry eyes: No  Watery eyes: No  Eye bulging: No  Double vision: Yes  Flashing of lights: Yes  Spots: Yes  Floaters: Yes  Redness: No  Crossed eyes: No  Tunnel Vision: No  Yellowing of eyes: No  Eye irritation: Yes  Cough: Yes  Sputum or phlegm: Yes  Coughing up blood: No  Difficulty breating or shortness of breath: Yes  Snoring: No  Wheezing: No  Difficulty breathing on exertion: Yes  Nighttime Cough: No  Difficulty breathing when lying flat: No  Heart burn or indigestion: Yes  Nausea: No  Vomiting: No  Abdominal pain: Yes  Bloating: No  Constipation: No  Diarrhea: No  Blood in stool: No  Black stools: No  Rectal or Anal pain: No  Fecal incontinence:  "No  Yellowing of skin or eyes: No  Vomit with blood: No  Change in stools: No  Trouble holding urine or incontinence: Yes  Pain or burning: No  Trouble starting or stopping: Yes  Increased frequency of urination: Yes  Blood in urine: No  Decreased frequency of urination: No  Frequent nighttime urination: Yes  Flank pain: No  Difficulty emptying bladder: No  Scrotal pain or swelling: No  Erectile dysfunction: Yes  Penile discharge: No  Genital ulcers: No  Reduced libido: Yes  Back pain: Yes  Muscle aches: Yes  Neck pain: Yes  Swollen joints: No  Joint pain: Yes  Bone pain: Yes  Muscle cramps: Yes  Muscle weakness: Yes  Joint stiffness: No  Bone fracture: No          PHYSICAL EXAM:  /81 (BP Location: Right arm, Cuff Size: Adult Regular)   Pulse 96   Ht 1.753 m (5' 9\")   Wt 75.3 kg (166 lb)   SpO2 97%   BMI 24.51 kg/m       Resting room air saturation is 86% and improves to 97% with 2 L nasal cannula.  General: No distress  Eyes: Anicteric  Ears: Hearing grossly normal  Mouth: Oral mucosa is moist, without any lesions. No oropharyngeal exudate.  Neck: supple, no thyromegaly  Lymphatics: No cervical or supraclavicular nodes  Respiratory: Quiet breath sounds, no wheezing or crackles  Cardiac: RRR, normal S1, S2. No murmurs.   Abdomen: Soft, NT/ND  Musculoskeletal: Extremities normal. No clubbing. No cyanosis. No edema.  Skin: No rash on limited exam  Neuro: Normal mentation. Normal speech.  Psych:Normal affect           Data:   All laboratory and imaging data reviewed.      PFT:   Spirometry performed in April 2021 shows an FEV1/FVC ratio of 0.25.  The FVC is 2.96 L which is 71% predicted.  The FEV1 is 0.72 L which is 24% predicted.  The total lung capacity is 170% predicted.  Residual volume is 292% predicted.     PFT Interpretation:  Very severe airflow obstruction.  Gas trapping and hyperinflation  Valid Maneuver    He had a 6-minute walk test performed today.  His resting room air saturation is 86%.  While " on 2 L nasal cannula his oxygen saturation aristides to 96%.  Using 2 L/min pulsed dose oxygen he performed a 6-minute walk.  His oxygen saturation dropped to as low as 89% during the walk.  He described 7 out of 10 dyspnea on the Yu scale at the end of the walk.  He walked 600 feet      Chest CT: I have reviewed the chest CT images from May 2021 and agree with the radiologist interpretation below:  Lungs: Tracheal secretions/debris in the trachea. Severe paraseptal  and centrilobular emphysematous changes. The lungs are hyperinflated.  Biapical scarring. No pneumothorax, pleural effusion, or focal  consolidation. Multiple bilateral pulmonary nodules, including (all  series 4):  - Stable 4 mm solid pulmonary nodule of the posterior left upper lobe  (image 57)  - Stable 12 x 5 mm partially calcified nodule of the left upper lobe  (image 87)  - Stable 7 mm solid pulmonary nodule of the right upper lobe (image  61)     No new or enlarging pulmonary nodules. Several scattered calcified  granulomas.     Mediastinum: The visualized thyroid gland is unremarkable. Right chest  wall Port-A-Cath tip in the low SVC. Atherosclerotic calcifications of  the great vessels and aortic arch. The heart is not enlarged. No  significant pericardial effusion. Coronary artery calcifications. The  ascending aorta and main pulmonary artery are normal in caliber. No  mediastinal or axillary lymphadenopathy.     Upper abdomen: Calcifications of the pancreatic parenchyma. 5 mm right  renal calculi.     Bones/soft tissues: Degenerative changes of the spine. No acute  osseous lesions.                                                                      IMPRESSION:   1. Severe paraseptal and centrilobular emphysematous changes of the  lungs.  2. Multiple bilateral pulmonary nodules are stable dating back to  2018, including a partially calcified 12 x 5 mm nodule of the left  upper lobe.  5. Partially visualized 5 mm right renal calculi.                         DME (Durable Medical Equipment) Orders and Documentation  Orders Placed This Encounter   Procedures     Oxygen Order      The patient was assessed and it was determined the patient is in need of the following listed DME Supplies/Equipment. Please complete supporting documentation below to demonstrate medical necessity.      Oxygen Use Documentation  I certify that this patient, John Yanes has been under my care and that I, or a nurse practitioner or physician's assistant working with me, had a face-to-face encounter that meets face-to-face encounter requirements with this patient on August 9, 2022.    John Yanes is now in a chronic stable state and continues to require supplemental oxygen due to continued oxygen desaturation.  This patient has been treated in part, or in whole for the following medical condition(s):  Chronic Respiratory Failure with Hypoxia J96.11  COPD J44.9  Treatments tried and failed or ruled out to treat hypoxemia include nebulizers.  If portability is ordered, is the patient mobile within the home? yes

## 2022-08-09 ENCOUNTER — TELEPHONE (OUTPATIENT)
Dept: PULMONOLOGY | Facility: CLINIC | Age: 72
End: 2022-08-09

## 2022-08-09 PROBLEM — J43.2 CENTRILOBULAR EMPHYSEMA (H): Status: ACTIVE | Noted: 2017-11-16

## 2022-08-09 LAB
FIO2-PRE: 21 %
FIO2-PRE: 28 %

## 2022-08-09 NOTE — TELEPHONE ENCOUNTER
Pt due for O2 recertification, as documented previously by MAGY Benavides. Dr. Busch would also like patient to completed CHIN on O2 @ 2L/min. Fax cover sheet, demographic sheet, new oxygen order, CHIN order (to be completed at 2L/min and faxed back [phone]), recent 6MWT test results faxed to [facility] at [phone]

## 2022-08-12 DIAGNOSIS — J44.9 COPD (CHRONIC OBSTRUCTIVE PULMONARY DISEASE) (H): Primary | ICD-10-CM

## 2022-08-17 ENCOUNTER — TRANSFERRED RECORDS (OUTPATIENT)
Dept: PULMONOLOGY | Facility: CLINIC | Age: 72
End: 2022-08-17

## 2022-08-22 ENCOUNTER — MYC MEDICAL ADVICE (OUTPATIENT)
Dept: PULMONOLOGY | Facility: CLINIC | Age: 72
End: 2022-08-22

## 2022-08-22 DIAGNOSIS — J44.9 SEVERE CHRONIC OBSTRUCTIVE PULMONARY DISEASE (H): ICD-10-CM

## 2022-08-22 RX ORDER — LEVALBUTEROL INHALATION SOLUTION 1.25 MG/3ML
1 SOLUTION RESPIRATORY (INHALATION) 2 TIMES DAILY
Qty: 180 ML | Refills: 5 | Status: SHIPPED | OUTPATIENT
Start: 2022-08-22 | End: 2023-02-21

## 2022-08-24 ENCOUNTER — TELEPHONE (OUTPATIENT)
Dept: PULMONOLOGY | Facility: CLINIC | Age: 72
End: 2022-08-24

## 2022-08-24 NOTE — TELEPHONE ENCOUNTER
Received CHIN results, which were completed on RA per report. Contacted [employer] and asked that test is repeated on 2L/min, as stated on order.  Contacted pt to advise test needs to be repeated on 2L/min.

## 2022-09-17 ENCOUNTER — HEALTH MAINTENANCE LETTER (OUTPATIENT)
Age: 72
End: 2022-09-17

## 2022-09-19 ENCOUNTER — TELEPHONE (OUTPATIENT)
Dept: PULMONOLOGY | Facility: CLINIC | Age: 72
End: 2022-09-19

## 2022-09-20 ENCOUNTER — TELEPHONE (OUTPATIENT)
Dept: PULMONOLOGY | Facility: CLINIC | Age: 72
End: 2022-09-20

## 2022-09-20 NOTE — TELEPHONE ENCOUNTER
Let John know that Dr Busch has reviewed CHIN and that he should use nocturnal oxygen at 2L/min.  Pt verbalized understanding.  States Covid booster and flu vaccine received 9/16.

## 2022-11-23 DIAGNOSIS — J44.9 SEVERE CHRONIC OBSTRUCTIVE PULMONARY DISEASE (H): ICD-10-CM

## 2022-11-23 RX ORDER — ALBUTEROL SULFATE 90 UG/1
2 AEROSOL, METERED RESPIRATORY (INHALATION) EVERY 6 HOURS PRN
Qty: 54 G | Refills: 3 | Status: SHIPPED | OUTPATIENT
Start: 2022-11-23 | End: 2023-11-13

## 2023-01-03 ENCOUNTER — TELEPHONE (OUTPATIENT)
Facility: CLINIC | Age: 73
End: 2023-01-03

## 2023-01-03 DIAGNOSIS — J44.9 STAGE 4 VERY SEVERE COPD BY GOLD CLASSIFICATION (H): ICD-10-CM

## 2023-01-03 NOTE — TELEPHONE ENCOUNTER
Pharmacy requesting to refill Lonmalloriea Bonnie Starter kit 25 SOLN  This medication is not on the patient's list.    Pharmacy:   Phone:   Fax:

## 2023-01-04 ENCOUNTER — MYC MEDICAL ADVICE (OUTPATIENT)
Dept: PULMONOLOGY | Facility: CLINIC | Age: 73
End: 2023-01-04

## 2023-01-04 DIAGNOSIS — J44.9 STAGE 4 VERY SEVERE COPD BY GOLD CLASSIFICATION (H): ICD-10-CM

## 2023-01-15 ENCOUNTER — MYC MEDICAL ADVICE (OUTPATIENT)
Dept: PULMONOLOGY | Facility: CLINIC | Age: 73
End: 2023-01-15
Payer: MEDICARE

## 2023-01-15 DIAGNOSIS — J44.9 STAGE 4 VERY SEVERE COPD BY GOLD CLASSIFICATION (H): Primary | ICD-10-CM

## 2023-01-19 RX ORDER — REVEFENACIN 175 UG/3ML
3 SOLUTION RESPIRATORY (INHALATION) DAILY
Qty: 90 ML | Refills: 5 | Status: SHIPPED | OUTPATIENT
Start: 2023-01-19 | End: 2023-01-19

## 2023-01-19 RX ORDER — REVEFENACIN 175 UG/3ML
3 SOLUTION RESPIRATORY (INHALATION) DAILY
Qty: 90 ML | Refills: 5 | Status: SHIPPED | OUTPATIENT
Start: 2023-01-19 | End: 2023-07-24

## 2023-01-19 NOTE — TELEPHONE ENCOUNTER
"Rx sent for Yuvalerie to replace glycopyrrolate, per Dr Busch.  Checked with pharmacy for coverage.  Per pharmacist this is covered under medicare part B however they will need new Rx with \"original start date = today's date, ICD 10, JESSY = 99 years\" in sig.  Pt aware of change in medication.  "

## 2023-01-23 DIAGNOSIS — J44.9 SEVERE CHRONIC OBSTRUCTIVE PULMONARY DISEASE (H): ICD-10-CM

## 2023-01-23 RX ORDER — BUDESONIDE 0.5 MG/2ML
0.5 INHALANT ORAL 2 TIMES DAILY
Qty: 120 ML | Refills: 11 | Status: SHIPPED | OUTPATIENT
Start: 2023-01-23 | End: 2024-01-31

## 2023-01-23 RX ORDER — ARFORMOTEROL TARTRATE 15 UG/2ML
15 SOLUTION RESPIRATORY (INHALATION) 2 TIMES DAILY
Qty: 120 ML | Refills: 11 | Status: SHIPPED | OUTPATIENT
Start: 2023-01-23 | End: 2023-09-08 | Stop reason: ALTCHOICE

## 2023-02-21 ENCOUNTER — MYC MEDICAL ADVICE (OUTPATIENT)
Dept: PULMONOLOGY | Facility: CLINIC | Age: 73
End: 2023-02-21
Payer: MEDICARE

## 2023-02-21 DIAGNOSIS — J44.9 SEVERE CHRONIC OBSTRUCTIVE PULMONARY DISEASE (H): ICD-10-CM

## 2023-02-21 RX ORDER — LEVALBUTEROL INHALATION SOLUTION 1.25 MG/3ML
1 SOLUTION RESPIRATORY (INHALATION) 2 TIMES DAILY
Qty: 180 ML | Refills: 5 | Status: SHIPPED | OUTPATIENT
Start: 2023-02-21 | End: 2023-08-10

## 2023-02-21 NOTE — TELEPHONE ENCOUNTER
Pt is waiting on Medicare paperwork to be completed by MD prior to dispensing Xopenex nebs. Writer will contact pharmacy as fax has not been received. Per pharmacy state they're waiting or a Rx refill.  Rx sent as requested.

## 2023-02-21 NOTE — TELEPHONE ENCOUNTER
Contacted pt's pharmacy regarding his [employer] message stating cost of albuterol has increased.  Per pharmacy Ventolin, preferred formulary, is $50 for 3.  Pt notified.

## 2023-03-27 ENCOUNTER — TELEPHONE (OUTPATIENT)
Dept: PULMONOLOGY | Facility: CLINIC | Age: 73
End: 2023-03-27
Payer: MEDICARE

## 2023-03-27 NOTE — TELEPHONE ENCOUNTER
Fax cover sheet, demographic sheet and signed rx renewal form faxed to Meseret Adamson at [facility] [phone]

## 2023-06-04 ENCOUNTER — HEALTH MAINTENANCE LETTER (OUTPATIENT)
Age: 73
End: 2023-06-04

## 2023-07-24 DIAGNOSIS — J44.9 STAGE 4 VERY SEVERE COPD BY GOLD CLASSIFICATION (H): ICD-10-CM

## 2023-07-24 RX ORDER — REVEFENACIN 175 UG/3ML
3 SOLUTION RESPIRATORY (INHALATION) DAILY
Qty: 90 ML | Refills: 5 | Status: SHIPPED | OUTPATIENT
Start: 2023-07-24 | End: 2023-11-25

## 2023-07-31 ENCOUNTER — TELEPHONE (OUTPATIENT)
Dept: PULMONOLOGY | Facility: CLINIC | Age: 73
End: 2023-07-31
Payer: MEDICARE

## 2023-07-31 NOTE — TELEPHONE ENCOUNTER
Patient Contacted    Appointment type: ANAM  Provider: MAXIM  Return date: 10/30/23  Specialty phone number: [phone]  Additional appointment(s) needed: NA  Additonal Notes: per RN.

## 2023-08-10 DIAGNOSIS — J44.9 SEVERE CHRONIC OBSTRUCTIVE PULMONARY DISEASE (H): ICD-10-CM

## 2023-08-10 RX ORDER — LEVALBUTEROL INHALATION SOLUTION 1.25 MG/3ML
1 SOLUTION RESPIRATORY (INHALATION) 2 TIMES DAILY
Qty: 180 ML | Refills: 5 | Status: SHIPPED | OUTPATIENT
Start: 2023-08-10 | End: 2024-02-20

## 2023-08-10 NOTE — TELEPHONE ENCOUNTER
Medication:   levalbuterol (XOPENEX) 1.25 MG/3ML neb solution 180 mL 5 2/21/2023  No   Sig - Route: Take 3 mLs (1.25 mg) by nebulization 2 times daily - Nebulization     Date last written: 2/21/23  Dispensed amount: 180 ml  Refills: 5    Requested Pharmacy: [id number] - [place] - [address]      Pt's last office visit: 8/8/22  Next scheduled office visit: 10/30/23

## 2023-08-11 DIAGNOSIS — J44.9 SEVERE CHRONIC OBSTRUCTIVE PULMONARY DISEASE (H): ICD-10-CM

## 2023-08-11 RX ORDER — LEVALBUTEROL INHALATION SOLUTION 1.25 MG/3ML
1 SOLUTION RESPIRATORY (INHALATION) 2 TIMES DAILY
Qty: 180 ML | Refills: 5 | OUTPATIENT
Start: 2023-08-11

## 2023-08-31 ENCOUNTER — MYC MEDICAL ADVICE (OUTPATIENT)
Dept: PULMONOLOGY | Facility: CLINIC | Age: 73
End: 2023-08-31
Payer: COMMERCIAL

## 2023-09-01 ENCOUNTER — TELEPHONE (OUTPATIENT)
Dept: PULMONOLOGY | Facility: CLINIC | Age: 73
End: 2023-09-01
Payer: COMMERCIAL

## 2023-09-01 DIAGNOSIS — J44.9 STAGE 4 VERY SEVERE COPD BY GOLD CLASSIFICATION (H): Primary | ICD-10-CM

## 2023-09-01 NOTE — LETTER
2023    INSURER: Payor: MEDICARE / Plan: MEDICARE / Product Type: Medicare /   ATTN: [employer] [facility] Prior Authorization Department  Re: Prior Authorization Request  Patient: John Yanes  Policy ID#:  [id number]  : 1950      To Whom it May Concern:    I am writing to formally request a prior authorization of coverage for my patient,  John Yanes, for treatment using arformoterol (BROVANA)15 MCG/2ML NEBU neb solution.  I am requesting authorization for applicable provider professional and facility services associated with this therapy.    The therapy involves arformoterol (BROVANA) to be used twice daily for 1 year.      The benefits of the therapy include management of severe COPD. [reference standard of care or clinical trial supportive evidence and results].      I have treated John Yanes since 2017 and I have determined that it is medically appropriate for this patient to be treated with arformoterol (BROVANA)15 MCG/2ML NEBU neb solutionfor the reason(s) stated below:    Very Severe COPD   Chronic Hypoxemic and hypercapnic Respiratory Failure  Emphysema  [Describe patient s medical condition with exact diagnosis and symptoms associated with the disease]    Alternative treatment(s) tried or considered and deemed clinically effective for treatment of Chronic Respiratory Failure with Hypoxia J96.11  COPD J44.9 include nebulizers.[List failed drug treatments and side effects and other therapies tried and failed. Add Additional Clinical Detail, for example, note why/if patient is not a suitable candidate for other therapy. Be specific and provide details ]    Unfortunately he is not a lung transplant candidate due to medical comorbidities of previous bladder cancer and osteoporosis. I have previously worked him up for endobronchial valve placement, but he did not have anatomy at that time that would be suitable for valves. [Discuss why the procedure is the most appropriate treatment for the  patient s condition ]    If the patient does not receive this medication, his current quality of life with decrease and is at high risk for COPD exacerbation subsequently leading to hospitalization. [Discuss implications if patient does not get the therapy, eg outcomes, quality of life)]    I have included medical records pertaining to the patient s medical history, current condition and treatment plan.  In addition, the following billing codes will be used for therapy and follow-up: [INSERT ICD-10 DIAGNOSIS and CPT CODES].      Attached are [LIST ANY APPLICABLE ATTACHMENTS EG, ARTICLES, INSERTS, PROTOCOLS etc. ] for your reference.    I firmly believe that this therapy is clinically appropriate and that John Yanes would benefit from improved [clinical outcomes, quality of life] if allowed the opportunity to receive this treatment.  Please contact me at Dept: [phone] if you require additional information to ensure the prompt approval for coverage.    Please send your written decision to me at this address:  [facility] [address] [address] [phone]  Dept: [phone]  E-mail: [email or web address]      Sincerely,      Holden Busch MD        Enclosures

## 2023-09-06 ENCOUNTER — TELEPHONE (OUTPATIENT)
Dept: PULMONOLOGY | Facility: CLINIC | Age: 73
End: 2023-09-06
Payer: COMMERCIAL

## 2023-09-06 NOTE — TELEPHONE ENCOUNTER
Fax received from [facility] requesting office notes from march-August 2023 to secure authorization from insurance to cover Life 2000. I contacted Tiki to let her know the pt has not been seen since Aug 2022. She will contact the patient to see if he has any notes from elsewhere in that timeframe.

## 2023-09-07 ENCOUNTER — TELEPHONE (OUTPATIENT)
Facility: CLINIC | Age: 73
End: 2023-09-07
Payer: COMMERCIAL

## 2023-09-07 DIAGNOSIS — J44.9 STAGE 4 VERY SEVERE COPD BY GOLD CLASSIFICATION (H): Primary | ICD-10-CM

## 2023-09-07 RX ORDER — FORMOTEROL FUMARATE DIHYDRATE 20 UG/2ML
20 SOLUTION RESPIRATORY (INHALATION) EVERY 12 HOURS
Qty: 120 ML | Refills: 3 | Status: SHIPPED | OUTPATIENT
Start: 2023-09-07 | End: 2023-11-25

## 2023-09-07 RX ORDER — BUDESONIDE AND FORMOTEROL FUMARATE DIHYDRATE 160; 4.5 UG/1; UG/1
2 AEROSOL RESPIRATORY (INHALATION) 2 TIMES DAILY
Qty: 10.2 G | Refills: 3 | Status: SHIPPED | OUTPATIENT
Start: 2023-09-07 | End: 2023-09-07

## 2023-09-07 RX ORDER — AZITHROMYCIN 250 MG/1
250 TABLET, FILM COATED ORAL DAILY
Qty: 90 TABLET | Refills: 3 | Status: SHIPPED | OUTPATIENT
Start: 2023-09-07 | End: 2024-08-21

## 2023-09-07 NOTE — TELEPHONE ENCOUNTER
[facility] Call Center    Phone Message    May a detailed message be left on voicemail: yes     Reason for Call: Other: Ezekiel Shankar wanted the team to know that they receive what was need to help the appeal of the medication PA go forward a day late. The appeal has been denied. Please if anything questions call back to phone #  [phone] . Thank you!    Action Taken: Message routed to:  [facility] PULM    Travel Screening: Not Applicable

## 2023-09-07 NOTE — TELEPHONE ENCOUNTER
Pharmacy calling back, they found a medication that will be covered without PA through his part D Medicare. It is Formoterol, please advise pharmacy if this is a suitable replacement

## 2023-09-07 NOTE — TELEPHONE ENCOUNTER
[facility] Call Center    Phone Message    May a detailed message be left on voicemail: yes     Reason for Call: Medication Refill Request    Has the patient contacted the pharmacy for the refill? Yes   Name of medication being requested: formoterol  Provider who prescribed the medication: Dr Busch  Pharmacy: [facility] in [place]  Date medication is needed: pharmacy would like new Rx.  Patient cannot use inhaler Symbicort       Action Taken: Other: endo    Travel Screening: Not Applicable

## 2023-09-07 NOTE — TELEPHONE ENCOUNTER
Called pharmacy back after speaking with Dr. Busch.  MD would like pt to be on formoterol inhaler while he (MD) calls pt's insurance to appeal the arformoterol (BROVANA) neb solution.  Order placed for formoterol inhaler.      Called pt to inform him of the above.  He also told me that he has been off of his Zithromax because it was prescribed through [facility] and never called in for a refill.  I told pt I will ask Dr. Busch to refill this medication.  I got a verbal from MD to refill the Zithromax.    Pt agreeable to the above plan.    Barbie Rao RN on 9/7/2023 at 12:50 PM

## 2023-09-08 ENCOUNTER — MYC MEDICAL ADVICE (OUTPATIENT)
Dept: PULMONOLOGY | Facility: CLINIC | Age: 73
End: 2023-09-08
Payer: COMMERCIAL

## 2023-09-08 NOTE — TELEPHONE ENCOUNTER
Called pharmacy to verify formoterol (PERFOROMIST) medication that was prescribed yesterday went through pt's insurance, per request from Dr. Busch. Pharmacy staff informed me that pt picked up his medications yesterday, so yes it did go through.  MD stated to me that this medication will take place of arformoterol (BROVANA) and will not be calling his insurance to appeal denial of medication.  [employer] message sent to pt to inform him of this.    Barbie Rao RN on 9/8/2023 at 2:29 PM

## 2023-09-08 NOTE — TELEPHONE ENCOUNTER
[facility] Appeals contacted us states that patient would like an appeal process started and a PA for Chrissy.  [facility] states that they faxed a request for clinic information to us today 9/8/23.

## 2023-09-12 ENCOUNTER — TELEPHONE (OUTPATIENT)
Dept: PULMONOLOGY | Facility: CLINIC | Age: 73
End: 2023-09-12
Payer: COMMERCIAL

## 2023-09-12 NOTE — TELEPHONE ENCOUNTER
Laura from [facility] called and stated patient approved for Brovana through part B, needing confirmation of diagnosis. RN confirmed COPD         RN stated patient is switched to Formoterol per RN's notes and conversation with MD.     -Lyric, RN

## 2023-10-30 ENCOUNTER — OFFICE VISIT (OUTPATIENT)
Dept: PULMONOLOGY | Facility: CLINIC | Age: 73
End: 2023-10-30
Payer: COMMERCIAL

## 2023-10-30 VITALS — OXYGEN SATURATION: 92 % | HEART RATE: 109 BPM | SYSTOLIC BLOOD PRESSURE: 136 MMHG | DIASTOLIC BLOOD PRESSURE: 67 MMHG

## 2023-10-30 DIAGNOSIS — J44.9 STAGE 4 VERY SEVERE COPD BY GOLD CLASSIFICATION (H): Primary | ICD-10-CM

## 2023-10-30 DIAGNOSIS — J96.11 HYPOXEMIC RESPIRATORY FAILURE, CHRONIC (H): ICD-10-CM

## 2023-10-30 LAB
6 MIN WALK (FT): 200 FT
6 MIN WALK (M): 61 M

## 2023-10-30 PROCEDURE — 94618 PULMONARY STRESS TESTING: CPT | Performed by: INTERNAL MEDICINE

## 2023-10-30 PROCEDURE — G0463 HOSPITAL OUTPT CLINIC VISIT: HCPCS

## 2023-10-30 PROCEDURE — 99214 OFFICE O/P EST MOD 30 MIN: CPT | Mod: 25

## 2023-10-30 NOTE — LETTER
10/30/2023         RE: John Yanes  [address]  [place] [address]        Dear Colleague,    Thank you for referring your patient, John Yanes, to the [facility] FOR LUNG SCIENCE AND [facility]. Please see a copy of my visit note below.    [facility]  Pulmonary Medicine  Visit Clinic Note  October 30, 2023         ASSESSMENT & PLAN       Very Severe COPD   Chronic Hypoxemic and hypercapnic Respiratory Failure  Emphysema     His airflow obstruction and emphysema do seem to be progressing.  We do not have recent spirometry, but he appears more barrel chested, and he is losing weight.  I am going to continue the same nebulized medications.  He is medications are all nebulized, steroids, long-acting beta agonists, and long-acting muscarinic antagonist.  He is also on daily azithromycin.  Life [employer] has helped with his breathing, and I would like to continue this. His walking oxygen assessment demonstrates that he needs 2 L oxygen with exertion.  He should continue to use 2 L at night.       I will have him return to the clinic in 6 months.    He has already received his flu vaccination this year.     Sebastian Busch MD     I spent 33 minutes minutes on the date of the encounter doing chart review, history and exam, documentation and discussing oxygen use and COPD therapies    I certify that this patient, John Yanes has been under my care (or a nurse practitioner or physican's assistant working with me). This is the face-to-face encounter for oxygen medical necessity.      At the time of this encounter supplemental oxygen is reasonable and necessary and is expected to improve the patient's condition in a home setting.       Patient has continued oxygen desaturation due to COPD J44.9.    If portability is ordered, is the patient mobile within the home? yes                 Today's visit note:     Chief Complaint: Shortness of breath    HISTORY OF PRESENT  ILLNESS:    This is a 73-year-old male with a history of very severe COPD, chronic hypoxemic and hypercapnic respiratory failure, who is presenting to the pulmonary clinic today for a follow-up visit.    His lung disease has advanced.  He has lost weight, now 140 pounds.  He gets early satiety, and some pain upon inspiration due to old back compression fractures.  He is using nebulized budesonide, Brovana, Yupelri, Xopenex.  He has azithromycin which she is taking daily.  He has not had a COPD exacerbation in the last year requiring prednisone or an additional antibiotic.  He has not needed to visit the emergency department.  Currently, he is using 3 L of oxygen with the rest, exertion and sleep.  He does have a life 2000 machine at home which she will use if he is going to be moving around a lot, or if he is acutely short of breath.           Past Medical and Surgical History:     Past Medical History:   Diagnosis Date     Bladder cancer (H)      Chronic pain      COPD (chronic obstructive pulmonary disease) (H)      Depression      GERD (gastroesophageal reflux disease)      High cholesterol      Hypertension      Osteoporosis 06/2016     Vertebral fracture, osteoporotic (H) 06/2016    T5     Past Surgical History:   Procedure Laterality Date     AS ESOPHAGOSCOPY, DIAGNOSTIC       AS KNEE SCOPE, DIAGNOSTIC       CYSTOSCOPY       CYSTOSCOPY, TRANSURETHRAL RESECTION (TUR) TUMOR BLADDER, COMBINED       RELEASE CARPAL TUNNEL             Family History:     Family History   Problem Relation Age of Onset     Dementia Mother      Liver Cancer Father      Heart Failure Brother               Social History:     Social History     Socioeconomic History     Marital status:      Spouse name: Not on file     Number of children: Not on file     Years of education: Not on file     Highest education level: Not on file   Occupational History     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      Occupation:      Occupation:    Tobacco Use     Smoking status: Former     Packs/day: 2.00     Years: 35.00     Additional pack years: 0.00     Total pack years: 70.00     Types: Cigarettes     Quit date: 2001     Years since quittin.8     Smokeless tobacco: Never   Substance and Sexual Activity     Alcohol use: Not on file     Drug use: Yes     Types: Marijuana     Comment: medical use for pain (takes pills)     Sexual activity: Not on file   Other Topics Concern     Parent/sibling w/ CABG, MI or angioplasty before 65F 55M? Not Asked   Social History Narrative     Not on file     Social Determinants of Health     Financial Resource Strain: Not on file   Food Insecurity: Not on file   Transportation Needs: Not on file   Physical Activity: Not on file   Stress: Not on file   Social Connections: Not on file   Interpersonal Safety: Not on file   Housing Stability: Not on file            Medications:     Current Outpatient Medications   Medication     albuterol (VENTOLIN HFA) 108 (90 Base) MCG/ACT inhaler     alendronate (FOSAMAX) 70 MG tablet     azithromycin (ZITHROMAX) 250 MG tablet     budesonide (PULMICORT) 0.5 MG/2ML neb solution     buPROPion (WELLBUTRIN XL) 300 MG 24 hr tablet     formoterol (PERFOROMIST) 20 MCG/2ML neb solution     levalbuterol (XOPENEX) 1.25 MG/3ML neb solution     omeprazole (PRILOSEC) 40 MG capsule     order for DME     order for DME     Revefenacin (YUPELRI) 175 MCG/3ML SOLN     tamsulosin (FLOMAX) 0.4 MG capsule     No current facility-administered medications for this visit.            Review of Systems:   All other review of systems are negative        PHYSICAL EXAM:  /67   Pulse 109   SpO2 92%      General: No distress  Eyes: Anicteric  Ears: Hearing grossly normal  Mouth: Oral mucosa is moist, without any lesions. No oropharyngeal exudate.  Neck: supple, no thyromegaly  Lymphatics: No cervical or supraclavicular nodes  Respiratory:  Quiet breath sounds, no wheezing or crackles.  Barrel chested, using neck muscles to breathe.  Cardiac: RRR, normal S1, S2. No murmurs.   Abdomen: Soft, NT/ND  Musculoskeletal: Extremities normal. No clubbing. No cyanosis. No edema.  Skin: No rash on limited exam  Neuro: Normal mentation. Normal speech.  Psych:Normal affect           Data:   All laboratory and imaging data reviewed.      PFT:   Spirometry performed in April 2021 shows an FEV1/FVC ratio of 0.25.  The FVC is 2.96 L which is 71% predicted.  The FEV1 is 0.72 L which is 24% predicted.  The total lung capacity is 170% predicted.  Residual volume is 292% predicted.     PFT Interpretation:  Very severe airflow obstruction.  Gas trapping and hyperinflation  Valid Maneuver    Six Minute Walk 10/30/2023:        Chest CT: I have reviewed the chest CT images from May 2021 and agree with the radiologist interpretation below:  Lungs: Tracheal secretions/debris in the trachea. Severe paraseptal  and centrilobular emphysematous changes. The lungs are hyperinflated.  Biapical scarring. No pneumothorax, pleural effusion, or focal  consolidation. Multiple bilateral pulmonary nodules, including (all  series 4):  - Stable 4 mm solid pulmonary nodule of the posterior left upper lobe  (image 57)  - Stable 12 x 5 mm partially calcified nodule of the left upper lobe  (image 87)  - Stable 7 mm solid pulmonary nodule of the right upper lobe (image  61)     No new or enlarging pulmonary nodules. Several scattered calcified  granulomas.     Mediastinum: The visualized thyroid gland is unremarkable. Right chest  wall Port-A-Cath tip in the low SVC. Atherosclerotic calcifications of  the great vessels and aortic arch. The heart is not enlarged. No  significant pericardial effusion. Coronary artery calcifications. The  ascending aorta and main pulmonary artery are normal in caliber. No  mediastinal or axillary lymphadenopathy.     Upper abdomen: Calcifications of the pancreatic  parenchyma. 5 mm right  renal calculi.     Bones/soft tissues: Degenerative changes of the spine. No acute  osseous lesions.                                                                      IMPRESSION:   1. Severe paraseptal and centrilobular emphysematous changes of the  lungs.  2. Multiple bilateral pulmonary nodules are stable dating back to  2018, including a partially calcified 12 x 5 mm nodule of the left  upper lobe.  5. Partially visualized 5 mm right renal calculi.                        DME (Durable Medical Equipment) Orders and Documentation  No orders of the defined types were placed in this encounter.     The patient was assessed and it was determined the patient is in need of the following listed DME Supplies/Equipment. Please complete supporting documentation below to demonstrate medical necessity.      Oxygen Use Documentation  I certify that this patient, John Yanes has been under my care and that I, or a nurse practitioner or physician's assistant working with me, had a face-to-face encounter that meets face-to-face encounter requirements with this patient on October 30, 2023.    John Yanes is now in a chronic stable state and continues to require supplemental oxygen due to continued oxygen desaturation.  This patient has been treated in part, or in whole for the following medical condition(s):  Chronic Respiratory Failure with Hypoxia J96.11  COPD J44.9  Treatments tried and failed or ruled out to treat hypoxemia include nebulizers.  If portability is ordered, is the patient mobile within the home? yes        DME (Durable Medical Equipment) Orders and Documentation  Orders Placed This Encounter   Procedures     Oxygen Order        The patient was assessed and it was determined the patient is in need of the following listed DME Supplies/Equipment. Please complete supporting documentation below to demonstrate medical necessity.                Again, thank you for allowing me to  participate in the care of your patient.        Sincerely,        Holden Busch MD

## 2023-10-30 NOTE — NURSING NOTE
Chief Complaint   Patient presents with    General Visit     F/u     Vitals were taken and medications were reconciled.     NAYELI Hurt

## 2023-10-30 NOTE — PROGRESS NOTES
[facility]  Pulmonary Medicine  Visit Clinic Note  October 30, 2023         ASSESSMENT & PLAN       Very Severe COPD   Chronic Hypoxemic and hypercapnic Respiratory Failure  Emphysema     His airflow obstruction and emphysema do seem to be progressing.  We do not have recent spirometry, but he appears more barrel chested, and he is losing weight.  I am going to continue the same nebulized medications.  He is medications are all nebulized, steroids, long-acting beta agonists, and long-acting muscarinic antagonist.  He is also on daily azithromycin.  Life 2000 has helped with his breathing, and I would like to continue this. His walking oxygen assessment demonstrates that he needs 2 L oxygen with exertion.  He should continue to use 2 L at night.       I will have him return to the clinic in 6 months.    He has already received his flu vaccination this year.     Sebastian Busch MD     I spent 33 minutes minutes on the date of the encounter doing chart review, history and exam, documentation and discussing oxygen use and COPD therapies    I certify that this patient, John Yanes has been under my care (or a nurse practitioner or physican's assistant working with me). This is the face-to-face encounter for oxygen medical necessity.      At the time of this encounter supplemental oxygen is reasonable and necessary and is expected to improve the patient's condition in a home setting.       Patient has continued oxygen desaturation due to COPD J44.9.    If portability is ordered, is the patient mobile within the home? yes                 Today's visit note:     Chief Complaint: Shortness of breath    HISTORY OF PRESENT ILLNESS:    This is a 73-year-old male with a history of very severe COPD, chronic hypoxemic and hypercapnic respiratory failure, who is presenting to the pulmonary clinic today for a follow-up visit.    His lung disease has advanced.  He has lost weight, now 140 pounds.  He gets early  satiety, and some pain upon inspiration due to old back compression fractures.  He is using nebulized budesonide, Brovana, Yupelri, Xopenex.  He has azithromycin which she is taking daily.  He has not had a COPD exacerbation in the last year requiring prednisone or an additional antibiotic.  He has not needed to visit the emergency department.  Currently, he is using 3 L of oxygen with the rest, exertion and sleep.  He does have a life 2000 machine at home which she will use if he is going to be moving around a lot, or if he is acutely short of breath.           Past Medical and Surgical History:     Past Medical History:   Diagnosis Date    Bladder cancer (H)     Chronic pain     COPD (chronic obstructive pulmonary disease) (H)     Depression     GERD (gastroesophageal reflux disease)     High cholesterol     Hypertension     Osteoporosis 2016    Vertebral fracture, osteoporotic (H) 2016    T5     Past Surgical History:   Procedure Laterality Date    AS ESOPHAGOSCOPY, DIAGNOSTIC      AS KNEE SCOPE, DIAGNOSTIC      CYSTOSCOPY      CYSTOSCOPY, TRANSURETHRAL RESECTION (TUR) TUMOR BLADDER, COMBINED      RELEASE CARPAL TUNNEL             Family History:     Family History   Problem Relation Age of Onset    Dementia Mother     Liver Cancer Father     Heart Failure Brother               Social History:     Social History     Socioeconomic History    Marital status:      Spouse name: Not on file    Number of children: Not on file    Years of education: Not on file    Highest education level: Not on file   Occupational History    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 Not Asked   Social History Narrative    Not on file     Social Determinants of Health     Financial Resource Strain: Not on file   Food Insecurity: Not on file   Transportation Needs: Not on file   Physical Activity: Not on file   Stress: Not on file   Social Connections: Not on file   Interpersonal Safety: Not on file   Housing Stability: Not on file            Medications:     Current Outpatient Medications   Medication    albuterol (VENTOLIN HFA) 108 (90 Base) MCG/ACT inhaler    alendronate (FOSAMAX) 70 MG tablet    azithromycin (ZITHROMAX) 250 MG tablet    budesonide (PULMICORT) 0.5 MG/2ML neb solution    buPROPion (WELLBUTRIN XL) 300 MG 24 hr tablet    formoterol (PERFOROMIST) 20 MCG/2ML neb solution    levalbuterol (XOPENEX) 1.25 MG/3ML neb solution    omeprazole (PRILOSEC) 40 MG capsule    order for DME    order for DME    Revefenacin (YUPELRI) 175 MCG/3ML SOLN    tamsulosin (FLOMAX) 0.4 MG capsule     No current facility-administered medications for this visit.            Review of Systems:   All other review of systems are negative        PHYSICAL EXAM:  /67   Pulse 109   SpO2 92%      General: No distress  Eyes: Anicteric  Ears: Hearing grossly normal  Mouth: Oral mucosa is moist, without any lesions. No oropharyngeal exudate.  Neck: supple, no thyromegaly  Lymphatics: No cervical or supraclavicular nodes  Respiratory: Quiet breath sounds, no wheezing or crackles.  Barrel chested, using neck muscles to breathe.  Cardiac: RRR, normal S1, S2. No murmurs.   Abdomen: Soft, NT/ND  Musculoskeletal: Extremities normal. No clubbing. No cyanosis. No edema.  Skin: No rash on limited exam  Neuro: Normal mentation. Normal speech.  Psych:Normal  affect           Data:   All laboratory and imaging data reviewed.      PFT:   Spirometry performed in April 2021 shows an FEV1/FVC ratio of 0.25.  The FVC is 2.96 L which is 71% predicted.  The FEV1 is 0.72 L which is 24% predicted.  The total lung capacity is 170% predicted.  Residual volume is 292% predicted.     PFT Interpretation:  Very severe airflow obstruction.  Gas trapping and hyperinflation  Valid Maneuver    Six Minute Walk 10/30/2023:        Chest CT: I have reviewed the chest CT images from May 2021 and agree with the radiologist interpretation below:  Lungs: Tracheal secretions/debris in the trachea. Severe paraseptal  and centrilobular emphysematous changes. The lungs are hyperinflated.  Biapical scarring. No pneumothorax, pleural effusion, or focal  consolidation. Multiple bilateral pulmonary nodules, including (all  series 4):  - Stable 4 mm solid pulmonary nodule of the posterior left upper lobe  (image 57)  - Stable 12 x 5 mm partially calcified nodule of the left upper lobe  (image 87)  - Stable 7 mm solid pulmonary nodule of the right upper lobe (image  61)     No new or enlarging pulmonary nodules. Several scattered calcified  granulomas.     Mediastinum: The visualized thyroid gland is unremarkable. Right chest  wall Port-A-Cath tip in the low SVC. Atherosclerotic calcifications of  the great vessels and aortic arch. The heart is not enlarged. No  significant pericardial effusion. Coronary artery calcifications. The  ascending aorta and main pulmonary artery are normal in caliber. No  mediastinal or axillary lymphadenopathy.     Upper abdomen: Calcifications of the pancreatic parenchyma. 5 mm right  renal calculi.     Bones/soft tissues: Degenerative changes of the spine. No acute  osseous lesions.                                                                      IMPRESSION:   1. Severe paraseptal and centrilobular emphysematous changes of the  lungs.  2. Multiple bilateral pulmonary  nodules are stable dating back to  2018, including a partially calcified 12 x 5 mm nodule of the left  upper lobe.  5. Partially visualized 5 mm right renal calculi.                        DME (Durable Medical Equipment) Orders and Documentation  No orders of the defined types were placed in this encounter.     The patient was assessed and it was determined the patient is in need of the following listed DME Supplies/Equipment. Please complete supporting documentation below to demonstrate medical necessity.      Oxygen Use Documentation  I certify that this patient, John Yanes has been under my care and that I, or a nurse practitioner or physician's assistant working with me, had a face-to-face encounter that meets face-to-face encounter requirements with this patient on October 30, 2023.    John Yanes is now in a chronic stable state and continues to require supplemental oxygen due to continued oxygen desaturation.  This patient has been treated in part, or in whole for the following medical condition(s):  Chronic Respiratory Failure with Hypoxia J96.11  COPD J44.9  Treatments tried and failed or ruled out to treat hypoxemia include nebulizers.  If portability is ordered, is the patient mobile within the home? yes        DME (Durable Medical Equipment) Orders and Documentation  Orders Placed This Encounter   Procedures    Oxygen Order        The patient was assessed and it was determined the patient is in need of the following listed DME Supplies/Equipment. Please complete supporting documentation below to demonstrate medical necessity.

## 2023-10-31 LAB — FIO2-PRE: 28 %

## 2023-11-02 ENCOUNTER — TELEPHONE (OUTPATIENT)
Facility: CLINIC | Age: 73
End: 2023-11-02
Payer: COMMERCIAL

## 2023-11-13 DIAGNOSIS — J44.9 SEVERE CHRONIC OBSTRUCTIVE PULMONARY DISEASE (H): ICD-10-CM

## 2023-11-13 RX ORDER — ALBUTEROL SULFATE 90 UG/1
2 AEROSOL, METERED RESPIRATORY (INHALATION) EVERY 6 HOURS PRN
Qty: 54 G | Refills: 3 | Status: SHIPPED | OUTPATIENT
Start: 2023-11-13

## 2023-11-25 ENCOUNTER — MYC REFILL (OUTPATIENT)
Dept: PULMONOLOGY | Facility: CLINIC | Age: 73
End: 2023-11-25
Payer: COMMERCIAL

## 2023-11-25 DIAGNOSIS — J44.9 STAGE 4 VERY SEVERE COPD BY GOLD CLASSIFICATION (H): ICD-10-CM

## 2023-11-27 RX ORDER — REVEFENACIN 175 UG/3ML
3 SOLUTION RESPIRATORY (INHALATION) DAILY
Qty: 90 ML | Refills: 5 | Status: SHIPPED | OUTPATIENT
Start: 2024-01-01 | End: 2024-04-28

## 2023-11-27 RX ORDER — FORMOTEROL FUMARATE DIHYDRATE 20 UG/2ML
20 SOLUTION RESPIRATORY (INHALATION) EVERY 12 HOURS
Qty: 120 ML | Refills: 3 | Status: SHIPPED | OUTPATIENT
Start: 2024-01-01 | End: 2024-03-29

## 2023-11-28 ENCOUNTER — TELEPHONE (OUTPATIENT)
Dept: PULMONOLOGY | Facility: CLINIC | Age: 73
End: 2023-11-28
Payer: COMMERCIAL

## 2023-11-28 NOTE — TELEPHONE ENCOUNTER
Faxed requested documentation to [facility] at [phone]. Form was Medical records summary form with prescriber's recommendation for continuing Life 2000.

## 2023-12-22 ENCOUNTER — MYC MEDICAL ADVICE (OUTPATIENT)
Dept: PULMONOLOGY | Facility: CLINIC | Age: 73
End: 2023-12-22
Payer: COMMERCIAL

## 2023-12-22 NOTE — TELEPHONE ENCOUNTER
No FAX received from [facility]. Writer called Jerrica to give her the heads up on this denial. Jerrica said she will start working on this.

## 2024-01-04 ENCOUNTER — MYC MEDICAL ADVICE (OUTPATIENT)
Dept: PULMONOLOGY | Facility: CLINIC | Age: 74
End: 2024-01-04
Payer: COMMERCIAL

## 2024-02-08 ENCOUNTER — TELEPHONE (OUTPATIENT)
Dept: PULMONOLOGY | Facility: CLINIC | Age: 74
End: 2024-02-08
Payer: COMMERCIAL

## 2024-02-08 NOTE — TELEPHONE ENCOUNTER
Marsha confirmed receipt of LMN for [id number], they will notify us of Appeal outcome when determined.

## 2024-02-20 DIAGNOSIS — J44.9 SEVERE CHRONIC OBSTRUCTIVE PULMONARY DISEASE (H): ICD-10-CM

## 2024-02-20 RX ORDER — LEVALBUTEROL INHALATION SOLUTION 1.25 MG/3ML
1 SOLUTION RESPIRATORY (INHALATION) 2 TIMES DAILY
Qty: 180 ML | Refills: 5 | Status: SHIPPED | OUTPATIENT
Start: 2024-02-20 | End: 2024-08-08

## 2024-03-29 ENCOUNTER — MYC REFILL (OUTPATIENT)
Dept: PULMONOLOGY | Facility: CLINIC | Age: 74
End: 2024-03-29
Payer: COMMERCIAL

## 2024-03-29 DIAGNOSIS — J44.9 STAGE 4 VERY SEVERE COPD BY GOLD CLASSIFICATION (H): ICD-10-CM

## 2024-03-29 RX ORDER — FORMOTEROL FUMARATE DIHYDRATE 20 UG/2ML
20 SOLUTION RESPIRATORY (INHALATION) EVERY 12 HOURS
Qty: 120 ML | Refills: 3 | Status: SHIPPED | OUTPATIENT
Start: 2024-03-29 | End: 2024-08-02

## 2024-04-28 ENCOUNTER — MYC REFILL (OUTPATIENT)
Dept: PULMONOLOGY | Facility: CLINIC | Age: 74
End: 2024-04-28
Payer: COMMERCIAL

## 2024-04-28 DIAGNOSIS — J44.9 STAGE 4 VERY SEVERE COPD BY GOLD CLASSIFICATION (H): ICD-10-CM

## 2024-04-28 DIAGNOSIS — J44.9 SEVERE CHRONIC OBSTRUCTIVE PULMONARY DISEASE (H): ICD-10-CM

## 2024-04-28 RX ORDER — FORMOTEROL FUMARATE DIHYDRATE 20 UG/2ML
20 SOLUTION RESPIRATORY (INHALATION) EVERY 12 HOURS
Qty: 120 ML | Refills: 3 | Status: CANCELLED | OUTPATIENT
Start: 2024-04-28

## 2024-04-29 RX ORDER — BUDESONIDE 0.5 MG/2ML
0.5 INHALANT ORAL 2 TIMES DAILY
Qty: 120 ML | Refills: 11 | Status: SHIPPED | OUTPATIENT
Start: 2024-04-29 | End: 2024-08-27

## 2024-04-29 RX ORDER — REVEFENACIN 175 UG/3ML
3 SOLUTION RESPIRATORY (INHALATION) DAILY
Qty: 90 ML | Refills: 5 | Status: SHIPPED | OUTPATIENT
Start: 2024-04-29 | End: 2024-08-27

## 2024-05-13 ENCOUNTER — TELEPHONE (OUTPATIENT)
Dept: PULMONOLOGY | Facility: CLINIC | Age: 74
End: 2024-05-13
Payer: COMMERCIAL

## 2024-05-13 NOTE — TELEPHONE ENCOUNTER
Faxed signed medical summary form and the requested office notes (notes from Nov 2023-current) to Yolanda at [phone] attn Tiki Mayfield

## 2024-05-23 ENCOUNTER — MYC REFILL (OUTPATIENT)
Dept: PULMONOLOGY | Facility: CLINIC | Age: 74
End: 2024-05-23
Payer: COMMERCIAL

## 2024-05-23 DIAGNOSIS — J44.9 STAGE 4 VERY SEVERE COPD BY GOLD CLASSIFICATION (H): ICD-10-CM

## 2024-05-23 DIAGNOSIS — J44.9 SEVERE CHRONIC OBSTRUCTIVE PULMONARY DISEASE (H): ICD-10-CM

## 2024-05-23 RX ORDER — FORMOTEROL FUMARATE DIHYDRATE 20 UG/2ML
20 SOLUTION RESPIRATORY (INHALATION) EVERY 12 HOURS
Qty: 120 ML | Refills: 3 | OUTPATIENT
Start: 2024-05-23

## 2024-05-23 RX ORDER — REVEFENACIN 175 UG/3ML
3 SOLUTION RESPIRATORY (INHALATION) DAILY
Qty: 90 ML | Refills: 5 | OUTPATIENT
Start: 2024-05-23

## 2024-05-23 RX ORDER — AZITHROMYCIN 250 MG/1
250 TABLET, FILM COATED ORAL DAILY
Qty: 90 TABLET | Refills: 3 | OUTPATIENT
Start: 2024-05-23

## 2024-05-23 RX ORDER — BUDESONIDE 0.5 MG/2ML
0.5 INHALANT ORAL 2 TIMES DAILY
Qty: 120 ML | Refills: 11 | OUTPATIENT
Start: 2024-05-23

## 2024-05-23 NOTE — TELEPHONE ENCOUNTER
RN spoke with [facility]'s pharmacy tech, patient still has refills on file. Patient can  tomorrow    -MAGY Gunter

## 2024-08-02 ENCOUNTER — TELEPHONE (OUTPATIENT)
Dept: PULMONOLOGY | Facility: CLINIC | Age: 74
End: 2024-08-02
Payer: COMMERCIAL

## 2024-08-02 DIAGNOSIS — J44.9 STAGE 4 VERY SEVERE COPD BY GOLD CLASSIFICATION (H): ICD-10-CM

## 2024-08-02 RX ORDER — FORMOTEROL FUMARATE DIHYDRATE 20 UG/2ML
20 SOLUTION RESPIRATORY (INHALATION) EVERY 12 HOURS
Qty: 120 ML | Refills: 11 | Status: SHIPPED | OUTPATIENT
Start: 2024-08-02

## 2024-08-02 NOTE — TELEPHONE ENCOUNTER
Left Voicemail (1st Attempt) for the patient to call back and schedule the following:    Appointment type: ANAM  Provider: MAXIM  Return date: NEXT AVAILABLE  Specialty phone number: [phone]  Additional appointment(s) needed: N/A  Additonal Notes: Follow-up needed for med refills

## 2024-08-05 NOTE — TELEPHONE ENCOUNTER
Left Voicemail (2nd Attempt) for the patient to call back and schedule the following:    Appointment type: RPLUMAM  Provider: MAXIM  Return date: NEXT AVAILABLE  Specialty phone number: [phone]  Additional appointment(s) needed: N/A  Additonal Notes: Follow-up needed for med refills     Per Omkar Mary to schedule a virtual visit.

## 2024-08-08 ENCOUNTER — MYC MEDICAL ADVICE (OUTPATIENT)
Dept: PULMONOLOGY | Facility: CLINIC | Age: 74
End: 2024-08-08
Payer: COMMERCIAL

## 2024-08-08 ENCOUNTER — MYC REFILL (OUTPATIENT)
Dept: PULMONOLOGY | Facility: CLINIC | Age: 74
End: 2024-08-08
Payer: COMMERCIAL

## 2024-08-08 DIAGNOSIS — J44.9 SEVERE CHRONIC OBSTRUCTIVE PULMONARY DISEASE (H): ICD-10-CM

## 2024-08-08 DIAGNOSIS — J44.9 SEVERE CHRONIC OBSTRUCTIVE PULMONARY DISEASE (H): Primary | ICD-10-CM

## 2024-08-08 RX ORDER — LEVALBUTEROL INHALATION SOLUTION 1.25 MG/3ML
1 SOLUTION RESPIRATORY (INHALATION) 2 TIMES DAILY
Qty: 180 ML | Refills: 5 | Status: SHIPPED | OUTPATIENT
Start: 2024-08-08

## 2024-08-21 DIAGNOSIS — J44.9 STAGE 4 VERY SEVERE COPD BY GOLD CLASSIFICATION (H): ICD-10-CM

## 2024-08-21 RX ORDER — AZITHROMYCIN 250 MG/1
250 TABLET, FILM COATED ORAL DAILY
Qty: 90 TABLET | Refills: 3 | Status: SHIPPED | OUTPATIENT
Start: 2024-08-21

## 2024-08-27 ENCOUNTER — MYC REFILL (OUTPATIENT)
Dept: PULMONOLOGY | Facility: CLINIC | Age: 74
End: 2024-08-27
Payer: COMMERCIAL

## 2024-08-27 DIAGNOSIS — J44.9 SEVERE CHRONIC OBSTRUCTIVE PULMONARY DISEASE (H): ICD-10-CM

## 2024-08-27 DIAGNOSIS — J44.9 STAGE 4 VERY SEVERE COPD BY GOLD CLASSIFICATION (H): ICD-10-CM

## 2024-08-30 RX ORDER — REVEFENACIN 175 UG/3ML
3 SOLUTION RESPIRATORY (INHALATION) DAILY
Qty: 90 ML | Refills: 5 | Status: SHIPPED | OUTPATIENT
Start: 2024-08-30 | End: 2024-09-23

## 2024-08-30 RX ORDER — BUDESONIDE 0.5 MG/2ML
0.5 INHALANT ORAL 2 TIMES DAILY
Qty: 120 ML | Refills: 11 | Status: SHIPPED | OUTPATIENT
Start: 2024-08-30 | End: 2024-09-23

## 2024-09-23 ENCOUNTER — MYC REFILL (OUTPATIENT)
Dept: PULMONOLOGY | Facility: CLINIC | Age: 74
End: 2024-09-23
Payer: COMMERCIAL

## 2024-09-23 DIAGNOSIS — J44.9 SEVERE CHRONIC OBSTRUCTIVE PULMONARY DISEASE (H): ICD-10-CM

## 2024-09-23 DIAGNOSIS — J44.9 STAGE 4 VERY SEVERE COPD BY GOLD CLASSIFICATION (H): ICD-10-CM

## 2024-09-23 RX ORDER — REVEFENACIN 175 UG/3ML
3 SOLUTION RESPIRATORY (INHALATION) DAILY
Qty: 90 ML | Refills: 5 | Status: SHIPPED | OUTPATIENT
Start: 2024-09-23

## 2024-09-23 RX ORDER — BUDESONIDE 0.5 MG/2ML
0.5 INHALANT ORAL 2 TIMES DAILY
Qty: 120 ML | Refills: 11 | Status: SHIPPED | OUTPATIENT
Start: 2024-09-23

## 2024-10-28 ENCOUNTER — VIRTUAL VISIT (OUTPATIENT)
Dept: PULMONOLOGY | Facility: CLINIC | Age: 74
End: 2024-10-28
Payer: COMMERCIAL

## 2024-10-28 DIAGNOSIS — J44.1 COPD EXACERBATION (H): Primary | ICD-10-CM

## 2024-10-28 DIAGNOSIS — J96.11 HYPOXEMIC RESPIRATORY FAILURE, CHRONIC (H): ICD-10-CM

## 2024-10-28 DIAGNOSIS — J44.9 STAGE 4 VERY SEVERE COPD BY GOLD CLASSIFICATION (H): ICD-10-CM

## 2024-10-28 PROCEDURE — 99214 OFFICE O/P EST MOD 30 MIN: CPT | Mod: 95

## 2024-10-28 RX ORDER — LEVALBUTEROL INHALATION SOLUTION 1.25 MG/3ML
1 SOLUTION RESPIRATORY (INHALATION) 4 TIMES DAILY
Qty: 360 ML | Refills: 11 | Status: SHIPPED | OUTPATIENT
Start: 2024-10-28

## 2024-10-28 RX ORDER — PREDNISONE 20 MG/1
40 TABLET ORAL DAILY
Qty: 10 TABLET | Refills: 0 | Status: SHIPPED | OUTPATIENT
Start: 2024-10-28

## 2024-10-28 ASSESSMENT — PAIN SCALES - GENERAL: PAINLEVEL_OUTOF10: MODERATE PAIN (5)

## 2024-10-28 NOTE — LETTER
10/28/2024      John Yanes  [address]   [place] [address]      Dear Colleague,    Thank you for referring your patient, John Yanes, to the [facility]. Please see a copy of my visit note below.    Virtual Visit Details    Type of service:  Video Visit   Video Start Time: 1:00 PM  Video End Time:1:23 PM    Originating Location (pt. Location): Home    Distant Location (provider location):  On-site  Platform used for Video Visit: [facility] LUNG SCIENCE [address] [address]  Phone: [phone]  Fax: [phone]    Patient:  John Yanes, Date of birth 1950  Date of Visit:  10/28/2024  Referring Provider No ref. provider found      Assessment & Plan     Very Severe COPD in exacerbation  Chronic Hypoxemic and hypercapnic Respiratory Failure  Emphysema     His daily endurance has worsened due to his advancing lung disease.  I expect this will continue to be the case.  He is on fairly maximal medication therapy for COPD.  He did ask if there is any new medications available.  Recently, the FDA has approved Dupixent for use and COPD.  This is a possibility for him.  I would like to get some blood work including a CBC with differential to check his eosinophil count as well as an IgE level just to examine for type II inflammation, as this is what Dupixent is supposed to be helpful for.  He would like to get this done at a lab close to him.  I have also sent him a course of prednisone to help out with his current respiratory symptoms.  I changed his Xopenex dosing to 4 times a day rather than twice a day, as he does think he gets some benefit from it.    I will be in touch with him after the blood test results and see if he is a candidate for Dupixent.    Medical Decision Making            Holden Busch MD                ASSESSMENT & PLAN       Very  Severe COPD   Chronic Hypoxemic and hypercapnic Respiratory Failure  Emphysema     His airflow obstruction and emphysema do seem to be progressing.  We do not have recent spirometry, but he appears more barrel chested, and he is losing weight.  I am going to continue the same nebulized medications.  He is medications are all nebulized, steroids, long-acting beta agonists, and long-acting muscarinic antagonist.  He is also on daily azithromycin.  [employer] has helped with his breathing, and I would like to continue this. His walking oxygen assessment demonstrates that he needs 2 L oxygen with exertion.  He should continue to use 2 L at night.       I will have him return to the clinic in 6 months.    He has already received his flu vaccination this year.     Sebastian Busch MD     I spent 33 minutes minutes on the date of the encounter doing chart review, history and exam, documentation and discussing oxygen use and COPD therapies    I certify that this patient, John Yanes has been under my care (or a nurse practitioner or physican's assistant working with me). This is the face-to-face encounter for oxygen medical necessity.      At the time of this encounter supplemental oxygen is reasonable and necessary and is expected to improve the patient's condition in a home setting.       Patient has continued oxygen desaturation due to COPD J44.9.    If portability is ordered, is the patient mobile within the home? yes                 Today's visit note:     Chief Complaint: Shortness of breath    HISTORY OF PRESENT ILLNESS:    This is a 74-year-old male with a history of very severe COPD, chronic hypoxemic and hypercapnic respiratory failure.  This was a virtual visit.     He continues to struggle with shortness of breath.  He rarely ever leaves the house.  He does walk between rooms, but uses a walker given his instability.  He is using 3 L of oxygen through the day and night.  Continues on nebulized budesonide,  Fanta.    He has not received any recent prednisone.  He remains on continuous daily azithromycin           Past Medical and Surgical History:     Past Medical History:   Diagnosis Date     Bladder cancer (H)      Chronic pain      COPD (chronic obstructive pulmonary disease) (H)      Depression      GERD (gastroesophageal reflux disease)      High cholesterol      Hypertension      Osteoporosis 2016     Vertebral fracture, osteoporotic (H) 2016    T5     Past Surgical History:   Procedure Laterality Date     AS ESOPHAGOSCOPY, DIAGNOSTIC       AS KNEE SCOPE, DIAGNOSTIC       CYSTOSCOPY       CYSTOSCOPY, TRANSURETHRAL RESECTION (TUR) TUMOR BLADDER, COMBINED       RELEASE CARPAL TUNNEL             Family History:     Family History   Problem Relation Age of Onset     Dementia Mother      Liver Cancer Father      Heart Failure Brother               Social History:     Social History     Socioeconomic History     Marital status:      Spouse name: Not on file     Number of children: Not on file     Years of education: Not on file     Highest education level: Not on file   Occupational History     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     Occupation:      Occupation:    Tobacco Use     Smoking status: Former     Current packs/day: 0.00     Average packs/day: 2.0 packs/day for 35.0 years (70.0 ttl pk-yrs)     Types: Cigarettes     Start date: 1966     Quit date: 2001     Years since quittin.8     Smokeless tobacco: Never   Vaping Use     Vaping status: Never Used   Substance and Sexual Activity     Alcohol use: Not on file     Drug use: Yes     Types: Marijuana     Comment: medical use for pain (takes pills)     Sexual activity: Not on file   Other Topics Concern     Parent/sibling w/ CABG, MI or angioplasty before 65F 55M? Not Asked   Social History Narrative     Not on file     Social Drivers of Health      Financial Resource Strain: Low Risk  (7/3/2023)    Received [facility], [facility]    Financial Resource Strain      Difficulty of Paying Living Expenses: 3      Difficulty of Paying Living Expenses: Not on file   Food Insecurity: No Food Insecurity (7/3/2023)    Received from [facility], [facility]    Food Insecurity      Worried About Running Out of Food in the Last Year: 1   Transportation Needs: No Transportation Needs (7/3/2023)    Received from [facility], [facility]    Transportation Needs      Lack of Transportation (Medical): 1   Physical Activity: Not on file   Stress: Not on file   Social Connections: Unknown (7/18/2024)    Received from [facility]    Social Connections      Frequency of Communication with Friends and Family: Not on file   Interpersonal Safety: Not on file   Housing Stability: Low Risk  (7/3/2023)    Received from [facility], [facility]    Housing Stability      Unable to Pay for Housing in the Last Year: 1            Medications:     Current Outpatient Medications   Medication Sig Dispense Refill     albuterol (VENTOLIN HFA) 108 (90 Base) MCG/ACT inhaler Inhale 2 puffs into the lungs every 6 hours as needed for shortness of breath or wheezing 54 g 3     alendronate (FOSAMAX) 70 MG tablet every 7 days        azithromycin (ZITHROMAX) 250 MG tablet Take 1 tablet (250 mg) by mouth daily. 90 tablet 3     budesonide (PULMICORT) 0.5 MG/2ML neb solution Take 2 mLs (0.5 mg) by nebulization 2 times daily. 120 mL 11     buPROPion (WELLBUTRIN XL) 300 MG 24 hr tablet        formoterol (PERFOROMIST) 20 MCG/2ML neb solution Take 2 mLs (20 mcg) by nebulization every 12 hours 120 mL 11      levalbuterol (XOPENEX) 1.25 MG/3ML neb solution Take 3 mLs (1.25 mg) by nebulization 2 times daily 180 mL 5     order for DME Equipment being ordered: Please provide Simply Go Mini to provide O2 at 2L/ min, pulsed dose with exertion, via nasal canula 1 Units 0     order for DME Equipment being ordered: Oxygen Please titrate patient for portable oxygen concentrator. Keeping sats above 90%, ok to try pulse dose if needed. 1 Device 0     Revefenacin (YUPELRI) 175 MCG/3ML SOLN Inhale 3 mLs (175 mcg) into the lungs daily. Orig.start 1/19/23, ICD-10 J44.9, length of need: 99 Yrs 90 mL 5     tamsulosin (FLOMAX) 0.4 MG capsule        omeprazole (PRILOSEC) 40 MG capsule as needed        No current facility-administered medications for this visit.            Review of Systems:   All other review of systems are negative        PHYSICAL EXAM:  There were no vitals taken for this visit.     Virtual visit           Data:   All laboratory and imaging data reviewed.      PFT:   Spirometry performed in April 2021 shows an FEV1/FVC ratio of 0.25.  The FVC is 2.96 L which is 71% predicted.  The FEV1 is 0.72 L which is 24% predicted.  The total lung capacity is 170% predicted.  Residual volume is 292% predicted.     PFT Interpretation:  Very severe airflow obstruction.  Gas trapping and hyperinflation  Valid Maneuver    Six Minute Walk 10/30/2023:        Chest CT: I have reviewed the chest CT images from May 2021 and agree with the radiologist interpretation below:  Lungs: Tracheal secretions/debris in the trachea. Severe paraseptal  and centrilobular emphysematous changes. The lungs are hyperinflated.  Biapical scarring. No pneumothorax, pleural effusion, or focal  consolidation. Multiple bilateral pulmonary nodules, including (all  series 4):  - Stable 4 mm solid pulmonary nodule of the posterior left upper lobe  (image 57)  - Stable 12 x 5 mm partially calcified nodule of the left upper lobe  (image 87)  - Stable 7 mm solid  pulmonary nodule of the right upper lobe (image  61)     No new or enlarging pulmonary nodules. Several scattered calcified  granulomas.     Mediastinum: The visualized thyroid gland is unremarkable. Right chest  wall Port-A-Cath tip in the low SVC. Atherosclerotic calcifications of  the great vessels and aortic arch. The heart is not enlarged. No  significant pericardial effusion. Coronary artery calcifications. The  ascending aorta and main pulmonary artery are normal in caliber. No  mediastinal or axillary lymphadenopathy.     Upper abdomen: Calcifications of the pancreatic parenchyma. 5 mm right  renal calculi.     Bones/soft tissues: Degenerative changes of the spine. No acute  osseous lesions.                                                                      IMPRESSION:   1. Severe paraseptal and centrilobular emphysematous changes of the  lungs.  2. Multiple bilateral pulmonary nodules are stable dating back to  2018, including a partially calcified 12 x 5 mm nodule of the left  upper lobe.  5. Partially visualized 5 mm right renal calculi.                 DME (Durable Medical Equipment) Orders and Documentation  No orders of the defined types were placed in this encounter.     The patient was assessed and it was determined the patient is in need of the following listed DME Supplies/Equipment. Please complete supporting documentation below to demonstrate medical necessity.      Oxygen Use Documentation  I certify that this patient, John Yanes has been under my care and that I, or a nurse practitioner or physician's assistant working with me, had a face-to-face encounter that meets face-to-face encounter requirements with this patient on October 30, 2023.    John Yanes is now in a chronic stable state and continues to require supplemental oxygen due to continued oxygen desaturation.  This patient has been treated in part, or in whole for the following medical condition(s):  Chronic Respiratory  Failure with Hypoxia J96.11  COPD J44.9  Treatments tried and failed or ruled out to treat hypoxemia include nebulizers.  If portability is ordered, is the patient mobile within the home? yes              Again, thank you for allowing me to participate in the care of your patient.        Sincerely,        Holden Busch MD

## 2024-10-28 NOTE — PROGRESS NOTES
Virtual Visit Details    Type of service:  Video Visit   Video Start Time: 1:00 PM  Video End Time:1:23 PM    Originating Location (pt. Location): Home    Distant Location (provider location):  On-site  Platform used for Video Visit: [facility]G SCIENCE AND HE[facility] [address] [address]  Phone: [phone]  Fax: [phone]    Patient:  John Yanes, Date of birth 1950  Date of Visit:  10/28/2024  Referring Provider No ref. provider found      Assessment & Plan      Very Severe COPD in exacerbation  Chronic Hypoxemic and hypercapnic Respiratory Failure  Emphysema     His daily endurance has worsened due to his advancing lung disease.  I expect this will continue to be the case.  He is on fairly maximal medication therapy for COPD.  He did ask if there is any new medications available.  Recently, the FDA has approved Dupixent for use and COPD.  This is a possibility for him.  I would like to get some blood work including a CBC with differential to check his eosinophil count as well as an IgE level just to examine for type II inflammation, as this is what Dupixent is supposed to be helpful for.  He would like to get this done at a lab close to him.  I have also sent him a course of prednisone to help out with his current respiratory symptoms.  I changed his Xopenex dosing to 4 times a day rather than twice a day, as he does think he gets some benefit from it.    I will be in touch with him after the blood test results and see if he is a candidate for Dupixent.    Medical Decision Making             Holden Busch MD                ASSESSMENT & PLAN       Very Severe COPD   Chronic Hypoxemic and hypercapnic Respiratory Failure  Emphysema     His airflow obstruction and emphysema do seem to be progressing.  We do not have recent spirometry, but he appears more barrel chested, and he is losing weight.  I am going to continue the  same nebulized medications.  He is medications are all nebulized, steroids, long-acting beta agonists, and long-acting muscarinic antagonist.  He is also on daily azithromycin.  [employer] has helped with his breathing, and I would like to continue this. His walking oxygen assessment demonstrates that he needs 2 L oxygen with exertion.  He should continue to use 2 L at night.       I will have him return to the clinic in 6 months.    He has already received his flu vaccination this year.     Sebastian Busch MD     I spent 33 minutes minutes on the date of the encounter doing chart review, history and exam, documentation and discussing oxygen use and COPD therapies    I certify that this patient, John Yanes has been under my care (or a nurse practitioner or physican's assistant working with me). This is the face-to-face encounter for oxygen medical necessity.      At the time of this encounter supplemental oxygen is reasonable and necessary and is expected to improve the patient's condition in a home setting.       Patient has continued oxygen desaturation due to COPD J44.9.    If portability is ordered, is the patient mobile within the home? yes                 Today's visit note:     Chief Complaint: Shortness of breath    HISTORY OF PRESENT ILLNESS:    This is a 74-year-old male with a history of very severe COPD, chronic hypoxemic and hypercapnic respiratory failure.  This was a virtual visit.     He continues to struggle with shortness of breath.  He rarely ever leaves the house.  He does walk between rooms, but uses a walker given his instability.  He is using 3 L of oxygen through the day and night.  Continues on nebulized budesonide, Perforomist and Yupelri.    He has not received any recent prednisone.  He remains on continuous daily azithromycin           Past Medical and Surgical History:     Past Medical History:   Diagnosis Date    Bladder cancer (H)     Chronic pain     COPD (chronic obstructive  pulmonary disease) (H)     Depression     GERD (gastroesophageal reflux disease)     High cholesterol     Hypertension     Osteoporosis 2016    Vertebral fracture, osteoporotic (H) 2016    T5     Past Surgical History:   Procedure Laterality Date    AS ESOPHAGOSCOPY, DIAGNOSTIC      AS KNEE SCOPE, DIAGNOSTIC      CYSTOSCOPY      CYSTOSCOPY, TRANSURETHRAL RESECTION (TUR) TUMOR BLADDER, COMBINED      RELEASE CARPAL TUNNEL             Family History:     Family History   Problem Relation Age of Onset    Dementia Mother     Liver Cancer Father     Heart Failure Brother               Social History:     Social History     Socioeconomic History    Marital status:      Spouse name: Not on file    Number of children: Not on file    Years of education: Not on file    Highest education level: Not on file   Occupational History    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 Not Asked   Social History Narrative    Not on file     Social Drivers of Health     Financial Resource Strain: Low Risk  (7/3/2023)    [facility]    Financial Resource Strain     Difficulty of Paying Living Expenses: 3     Difficulty of Paying Living Expenses: Not on file   Food Insecurity: No Food  Insecurity (7/3/2023)    Received from [facility], [facility]    Food Insecurity     Worried About Running Out of Food in the Last Year: 1   Transportation Needs: No Transportation Needs (7/3/2023)    Received from [facility], [facility]    Transportation Needs     Lack of Transportation (Medical): 1   Physical Activity: Not on file   Stress: Not on file   Social Connections: Unknown (7/18/2024)    Received from [facility]    Social Connections     Frequency of Communication with Friends and Family: Not on file   Interpersonal Safety: Not on file   Housing Stability: Low Risk  (7/3/2023)    Received from [facility], [facility]    Housing Stability     Unable to Pay for Housing in the Last Year: 1            Medications:     Current Outpatient Medications   Medication Sig Dispense Refill    albuterol (VENTOLIN HFA) 108 (90 Base) MCG/ACT inhaler Inhale 2 puffs into the lungs every 6 hours as needed for shortness of breath or wheezing 54 g 3    alendronate (FOSAMAX) 70 MG tablet every 7 days       azithromycin (ZITHROMAX) 250 MG tablet Take 1 tablet (250 mg) by mouth daily. 90 tablet 3    budesonide (PULMICORT) 0.5 MG/2ML neb solution Take 2 mLs (0.5 mg) by nebulization 2 times daily. 120 mL 11    buPROPion (WELLBUTRIN XL) 300 MG 24 hr tablet       formoterol (PERFOROMIST) 20 MCG/2ML neb solution Take 2 mLs (20 mcg) by nebulization every 12 hours 120 mL 11    levalbuterol (XOPENEX) 1.25 MG/3ML neb solution Take 3 mLs (1.25 mg) by nebulization 2 times daily 180 mL 5    order for DME Equipment being ordered: Please provide Simply [employer] Mini to provide O2 at 2L/ min, pulsed dose with exertion, via nasal canula 1 Units 0    order for DME Equipment being ordered: Oxygen Please titrate patient  for portable oxygen concentrator. Keeping sats above 90%, ok to try pulse dose if needed. 1 Device 0    Revefenacin (YUPELRI) 175 MCG/3ML SOLN Inhale 3 mLs (175 mcg) into the lungs daily. Orig.start 1/19/23, ICD-10 J44.9, length of need: 99 Yrs 90 mL 5    tamsulosin (FLOMAX) 0.4 MG capsule       omeprazole (PRILOSEC) 40 MG capsule as needed        No current facility-administered medications for this visit.            Review of Systems:   All other review of systems are negative        PHYSICAL EXAM:  There were no vitals taken for this visit.     Virtual visit           Data:   All laboratory and imaging data reviewed.      PFT:   Spirometry performed in April 2021 shows an FEV1/FVC ratio of 0.25.  The FVC is 2.96 L which is 71% predicted.  The FEV1 is 0.72 L which is 24% predicted.  The total lung capacity is 170% predicted.  Residual volume is 292% predicted.     PFT Interpretation:  Very severe airflow obstruction.  Gas trapping and hyperinflation  Valid Maneuver    Six Minute Walk 10/30/2023:        Chest CT: I have reviewed the chest CT images from May 2021 and agree with the radiologist interpretation below:  Lungs: Tracheal secretions/debris in the trachea. Severe paraseptal  and centrilobular emphysematous changes. The lungs are hyperinflated.  Biapical scarring. No pneumothorax, pleural effusion, or focal  consolidation. Multiple bilateral pulmonary nodules, including (all  series 4):  - Stable 4 mm solid pulmonary nodule of the posterior left upper lobe  (image 57)  - Stable 12 x 5 mm partially calcified nodule of the left upper lobe  (image 87)  - Stable 7 mm solid pulmonary nodule of the right upper lobe (image  61)     No new or enlarging pulmonary nodules. Several scattered calcified  granulomas.     Mediastinum: The visualized thyroid gland is unremarkable. Right chest  wall Port-A-Cath tip in the low SVC. Atherosclerotic calcifications of  the great vessels and aortic arch. The heart is not  enlarged. No  significant pericardial effusion. Coronary artery calcifications. The  ascending aorta and main pulmonary artery are normal in caliber. No  mediastinal or axillary lymphadenopathy.     Upper abdomen: Calcifications of the pancreatic parenchyma. 5 mm right  renal calculi.     Bones/soft tissues: Degenerative changes of the spine. No acute  osseous lesions.                                                                      IMPRESSION:   1. Severe paraseptal and centrilobular emphysematous changes of the  lungs.  2. Multiple bilateral pulmonary nodules are stable dating back to  2018, including a partially calcified 12 x 5 mm nodule of the left  upper lobe.  5. Partially visualized 5 mm right renal calculi.                 DME (Durable Medical Equipment) Orders and Documentation  No orders of the defined types were placed in this encounter.     The patient was assessed and it was determined the patient is in need of the following listed DME Supplies/Equipment. Please complete supporting documentation below to demonstrate medical necessity.      Oxygen Use Documentation  I certify that this patient, John Yanes has been under my care and that I, or a nurse practitioner or physician's assistant working with me, had a face-to-face encounter that meets face-to-face encounter requirements with this patient on October 30, 2023.    John Yanes is now in a chronic stable state and continues to require supplemental oxygen due to continued oxygen desaturation.  This patient has been treated in part, or in whole for the following medical condition(s):  Chronic Respiratory Failure with Hypoxia J96.11  COPD J44.9  Treatments tried and failed or ruled out to treat hypoxemia include nebulizers.  If portability is ordered, is the patient mobile within the home? yes

## 2024-10-28 NOTE — NURSING NOTE
Current patient location: [address] [place] [id number]    Is the patient currently in the state of [place]? YES    Visit mode:VIDEO    If the visit is dropped, the patient can be reconnected by: VIDEO VISIT: Send to e-mail at: [email or web address]    Will anyone else be joining the visit? NO  (If patient encounters technical issues they should call [phone]    Are changes needed to the allergy or medication list? Pt stated no changes to allergies and Pt stated no med changes    Are refills needed on medications prescribed by this physician? NO    Rooming Documentation:  Questionnaire(s) completed    Reason for visit: RECHECK    Lm LOVEF

## 2024-10-28 NOTE — Clinical Note
I placed orders for a CBC and IgE level.  He wants to get these done at an [facility] close to him.  Can you figure out which one it is and fax the results there?  Tell him not to start the prednisone prior to the blood work. He can start prednisone after he does the blood work

## 2024-10-29 ENCOUNTER — TELEPHONE (OUTPATIENT)
Dept: PULMONOLOGY | Facility: CLINIC | Age: 74
End: 2024-10-29
Payer: COMMERCIAL

## 2024-10-29 NOTE — TELEPHONE ENCOUNTER
RN faxed lab orders to [facility] [phone].    Trisha Marie RN  Pulmonary Nurse Care Coordinator

## 2024-10-29 NOTE — TELEPHONE ENCOUNTER
RN called and told the pt that Dr. Busch wants him to get a CBC and IgE level drawn. Pt reported that he wants the orders sent over to the [facility] in [place]. RN also relayed that Dr. Busch doesn't want him to start the prednisone until after her gets his blood drawn. Pt reported that he will call them and schedule a lab appt and will wait on the prednisone.     Trisha Marie RN  Pulmonary Nurse Care Coordinator

## 2024-11-12 ENCOUNTER — MYC MEDICAL ADVICE (OUTPATIENT)
Dept: PULMONOLOGY | Facility: CLINIC | Age: 74
End: 2024-11-12
Payer: COMMERCIAL

## 2024-11-30 ENCOUNTER — HEALTH MAINTENANCE LETTER (OUTPATIENT)
Age: 74
End: 2024-11-30

## 2024-12-09 DIAGNOSIS — J44.9 SEVERE CHRONIC OBSTRUCTIVE PULMONARY DISEASE (H): ICD-10-CM

## 2024-12-09 RX ORDER — ALBUTEROL SULFATE 90 UG/1
2 INHALANT RESPIRATORY (INHALATION) EVERY 6 HOURS PRN
Qty: 54 G | Refills: 3 | Status: SHIPPED | OUTPATIENT
Start: 2024-12-09

## 2024-12-16 ENCOUNTER — TRANSFERRED RECORDS (OUTPATIENT)
Dept: HEALTH INFORMATION MANAGEMENT | Facility: CLINIC | Age: 74
End: 2024-12-16
Payer: COMMERCIAL

## 2024-12-23 ENCOUNTER — MYC MEDICAL ADVICE (OUTPATIENT)
Dept: PULMONOLOGY | Facility: CLINIC | Age: 74
End: 2024-12-23
Payer: COMMERCIAL

## 2024-12-23 DIAGNOSIS — J44.9 STAGE 4 VERY SEVERE COPD BY GOLD CLASSIFICATION (H): Primary | ICD-10-CM

## 2024-12-31 ENCOUNTER — TELEPHONE (OUTPATIENT)
Facility: CLINIC | Age: 74
End: 2024-12-31

## 2024-12-31 NOTE — TELEPHONE ENCOUNTER
Received clinical questions for PA via fax. Routing to clinic to confirm, not sure patient meets qualifications.

## 2024-12-31 NOTE — TELEPHONE ENCOUNTER
PA Initiation    Medication: DUPIXENT 300 MG/2ML SC SOAJ  Insurance Company: [employer] - Phone [phone] Fax [phone]  Pharmacy Filling the Rx: [facility] - [place], [place] - [address]  Filling Pharmacy Phone:    Filling Pharmacy Fax:    Start Date: 12/31/2024    Key: [id number]

## 2025-01-02 DIAGNOSIS — J44.9 STAGE 4 VERY SEVERE COPD BY GOLD CLASSIFICATION (H): Primary | ICD-10-CM

## 2025-01-02 NOTE — TELEPHONE ENCOUNTER
Date: 25    Company: [employer]    Phone Number: [phone]    Representative: Shaneka    Details: Received call from [employer] requesting additional information, PA request will  on 1/3/25 at 8:04am CST. If additional info is not received, auto-denial will be sent and request will need to go through appeals process.

## 2025-01-23 NOTE — TELEPHONE ENCOUNTER
PRIOR AUTHORIZATION DENIED    Medication: DUPIXENT 300 MG/2ML SC SOAJ  Insurance Company: [employer] - Phone [phone] Fax [phone]  Denial Date: 1/2/2025  Denial Reason(s): Needs at least 2 exacerbations requiring OCS or a COPD-related hospitalization  Appeal Information:     Patient Notified: He's aware.

## 2025-01-28 ENCOUNTER — TRANSFERRED RECORDS (OUTPATIENT)
Dept: HEALTH INFORMATION MANAGEMENT | Facility: CLINIC | Age: 75
End: 2025-01-28
Payer: COMMERCIAL

## 2025-01-29 NOTE — TELEPHONE ENCOUNTER
Medication Appeal Initiation    Medication: DUPIXENT 300 MG/2ML SC SOAJ  Appeal Start Date:  1/29/2025  Insurance Company: [employer]  Insurance Phone: [phone]  Insurance Fax: [phone]  Comments:   Faxed in denial, LMN, dispense report of 2 OCS in 12 months, and OV notes.

## 2025-01-31 NOTE — TELEPHONE ENCOUNTER
Called [facility] ([phone]), appeal was received by tomorrow and determination will be made by tomorrow 2/1/25.

## 2025-02-05 NOTE — TELEPHONE ENCOUNTER
MEDICATION APPEAL APPROVED    Medication: DUPIXENT 300 MG/2ML SC SOAJ  Authorization Effective Date: 12/31/2024  Authorization Expiration Date: 12/31/2025  Approved Dose/Quantity: #4mLs per 28 days  Reference #: Key: [id number]   Appeal Insurance Company: [facility]  Expected CoPay: $ 988.04     CoPay Card Available: No  Financial Assistance Needed: LMPTCB/Sent MyC  Filling Pharmacy: [facility] - [place] [address]  Patient Notified: LMPTCB/Sent MyC  Comments:       Date: 2/5/25    Company: [facility]    Phone Number: [phone]    Representative: Shelly    Details: Appeal approved eff 12/31/24-12/31/25, Appeal #: [id number]. Requested approval letter be sent, will attach when received.

## 2025-02-06 NOTE — TELEPHONE ENCOUNTER
BENOIT INITIATED    Medication: DUPIXENT 300 MG/2ML SC SOAJ  [facility] Name: [facility]  Date submitted: 2/6/2025  1:26 PM   [facility] Phone:    Foundation Fax:

## 2025-02-07 NOTE — TELEPHONE ENCOUNTER
BENOIT APPROVED    Medication: DUPIXENT 300 MG/2ML SC SOAJ  Amount: $    Foundation Name: [facility] Effective Date: 2/6/2025  Foundation Expiration Date: 12/31/2025  Additional Information:   Patient Notified: Yes

## 2025-02-20 ENCOUNTER — PHARMACY VISIT (OUTPATIENT)
Dept: ADMINISTRATIVE | Facility: CLINIC | Age: 75
End: 2025-02-20
Payer: COMMERCIAL

## 2025-02-20 NOTE — PROGRESS NOTES
COPD Clinical Follow Up Assessment   Completed on 2025/02/20 15:29:42 [facility], by Elda Lopez        Activity Medications    DUPIXENT        Care Details     Summary Notes  I had the pleasure of speaking to pt for initial TM.   Current regimen: Dupixent 300mg every other week.   Side effects: None.   Doses: First dose was last Wednesday. Has a calendar to keep track of doses. PDC= too soon.   COPD: mMRC Grade 4. Pt notes no change in symptoms yet. Discussed goals of therapy.   Questions or concerns: None.   Follow up: Pt opted out of further TM calls. Will call with questions or concerns.       Elda LOPEZ, PharmD, CSP  Therapy Management Pharmacist  [address] [address]   [email or web address]  [email or web address]   Specialty: [phone]  Mail Order: [phone]

## 2025-03-04 ENCOUNTER — MYC MEDICAL ADVICE (OUTPATIENT)
Dept: PULMONOLOGY | Facility: CLINIC | Age: 75
End: 2025-03-04
Payer: COMMERCIAL

## 2025-03-05 DIAGNOSIS — J44.9 SEVERE CHRONIC OBSTRUCTIVE PULMONARY DISEASE (H): Primary | ICD-10-CM

## 2025-03-05 RX ORDER — PREDNISONE 20 MG/1
40 TABLET ORAL DAILY
Qty: 14 TABLET | Refills: 0 | Status: SHIPPED | OUTPATIENT
Start: 2025-03-05 | End: 2025-03-12

## 2025-03-06 ENCOUNTER — MYC REFILL (OUTPATIENT)
Dept: PULMONOLOGY | Facility: CLINIC | Age: 75
End: 2025-03-06
Payer: COMMERCIAL

## 2025-03-06 DIAGNOSIS — J44.9 STAGE 4 VERY SEVERE COPD BY GOLD CLASSIFICATION (H): ICD-10-CM

## 2025-03-06 NOTE — TELEPHONE ENCOUNTER
RN called to verify how many refills the pt has on file. Pt has 11 refills on file.    Trisha Marie RN  Pulmonary Nurse Care Coordinator

## 2025-03-24 DIAGNOSIS — J44.9 STAGE 4 VERY SEVERE COPD BY GOLD CLASSIFICATION (H): ICD-10-CM

## 2025-03-24 RX ORDER — REVEFENACIN 175 UG/3ML
3 SOLUTION RESPIRATORY (INHALATION) DAILY
Qty: 90 ML | Refills: 5 | Status: SHIPPED | OUTPATIENT
Start: 2025-03-24

## 2025-04-08 ENCOUNTER — TELEPHONE (OUTPATIENT)
Dept: PULMONOLOGY | Facility: CLINIC | Age: 75
End: 2025-04-08

## 2025-04-08 NOTE — TELEPHONE ENCOUNTER
RN called and spoke with Lisbeth. We will call her if we don't receive the form by tomorrow.     Trisha KELLY RN  Pulmonary Nurse Care Coordinator

## 2025-04-08 NOTE — TELEPHONE ENCOUNTER
[facility] Call Center    Phone Message    May a detailed message be left on voicemail: yes     Reason for Call: Other: Lisbeth from [facility] is calling to inform provider that she is sending over a form that will need to be filled out. She will be faxing them over now. Please call Lisbeth back at ph: [phone].     Action Taken: Message routed to:  [facility] Pulm     Travel Screening: Not Applicable     Date of Service: 4/8

## 2025-04-21 ENCOUNTER — TELEPHONE (OUTPATIENT)
Facility: CLINIC | Age: 75
End: 2025-04-21
Payer: COMMERCIAL

## 2025-04-21 NOTE — TELEPHONE ENCOUNTER
Prior Authorization Retail Medication Request    Medication/Dose: Yupelri 175 MCG/3ML  Diagnosis and ICD code (if different than what is on RX):    New/renewal/insurance change PA/secondary ins. PA:  Previously Tried and Failed:    Rationale:      Insurance   Primary:   Insurance ID:      Secondary (if applicable):  Insurance ID:      Pharmacy Information (if different than what is on RX)  Name:    Phone:    Fax:    Clinic Information  Preferred routing pool for dept communication:

## 2025-04-22 DIAGNOSIS — J44.9 STAGE 4 VERY SEVERE COPD BY GOLD CLASSIFICATION (H): ICD-10-CM

## 2025-04-22 RX ORDER — REVEFENACIN 175 UG/3ML
3 SOLUTION RESPIRATORY (INHALATION) DAILY
Qty: 90 ML | Refills: 5 | Status: SHIPPED | OUTPATIENT
Start: 2025-04-22

## 2025-04-22 NOTE — TELEPHONE ENCOUNTER
Prior Authorization Not Needed per Insurance    Medication: YUPELRI 175 MCG/3ML IN SOLN  Insurance Company: [employer] ([facility]) - Phone [phone] Fax [phone]  Expected CoPay: $    Pharmacy Filling the Rx:    Pharmacy Notified: yes- script sent when EPA done  Patient Notified:

## 2025-05-28 ENCOUNTER — MYC MEDICAL ADVICE (OUTPATIENT)
Dept: PULMONOLOGY | Facility: CLINIC | Age: 75
End: 2025-05-28
Payer: COMMERCIAL

## 2025-05-28 DIAGNOSIS — J44.9 SEVERE CHRONIC OBSTRUCTIVE PULMONARY DISEASE (H): ICD-10-CM

## 2025-05-28 RX ORDER — ALBUTEROL SULFATE 90 UG/1
2 AEROSOL, METERED RESPIRATORY (INHALATION) EVERY 6 HOURS PRN
Qty: 54 G | Refills: 3 | Status: SHIPPED | OUTPATIENT
Start: 2025-05-28

## 2025-06-02 ENCOUNTER — TELEPHONE (OUTPATIENT)
Dept: PULMONOLOGY | Facility: CLINIC | Age: 75
End: 2025-06-02
Payer: COMMERCIAL

## 2025-06-02 NOTE — TELEPHONE ENCOUNTER
Left Voicemail (1st Attempt) and Sent Mychart (1st Attempt) for the patient to call back and schedule the following:    Appointment type: Return Pulm  Provider: Narayan  Return date: October 2025  Specialty phone number: [phone]  Additional appointment(s) needed: na  Additonal Notes: na

## 2025-06-04 NOTE — TELEPHONE ENCOUNTER
Left Voicemail (2nd Attempt) for the patient to call back and schedule the following:    Appointment type: Return Pulm  Provider: Narayan  Return date: October 2025  Specialty phone number: [phone]  Additional appointment(s) needed: na  Additonal Notes: na

## 2025-07-22 ENCOUNTER — MYC REFILL (OUTPATIENT)
Dept: PULMONOLOGY | Facility: CLINIC | Age: 75
End: 2025-07-22
Payer: COMMERCIAL

## 2025-07-22 DIAGNOSIS — J44.9 STAGE 4 VERY SEVERE COPD BY GOLD CLASSIFICATION (H): ICD-10-CM

## 2025-07-22 RX ORDER — REVEFENACIN 175 UG/3ML
3 SOLUTION RESPIRATORY (INHALATION) DAILY
Qty: 90 ML | Refills: 2 | Status: SHIPPED | OUTPATIENT
Start: 2025-07-22

## 2025-08-12 ENCOUNTER — MYC REFILL (OUTPATIENT)
Dept: PULMONOLOGY | Facility: CLINIC | Age: 75
End: 2025-08-12
Payer: COMMERCIAL

## 2025-08-12 DIAGNOSIS — J44.9 STAGE 4 VERY SEVERE COPD BY GOLD CLASSIFICATION (H): ICD-10-CM

## 2025-08-12 DIAGNOSIS — J44.1 COPD EXACERBATION (H): ICD-10-CM

## 2025-08-12 DIAGNOSIS — J44.9 SEVERE CHRONIC OBSTRUCTIVE PULMONARY DISEASE (H): ICD-10-CM

## 2025-08-12 RX ORDER — BUDESONIDE 0.5 MG/2ML
0.5 INHALANT ORAL 2 TIMES DAILY
Qty: 120 ML | Refills: 3 | OUTPATIENT
Start: 2025-08-12

## 2025-08-12 RX ORDER — AZITHROMYCIN 250 MG/1
250 TABLET, FILM COATED ORAL DAILY
Qty: 90 TABLET | Refills: 3 | Status: SHIPPED | OUTPATIENT
Start: 2025-08-12

## 2025-08-12 RX ORDER — PREDNISONE 20 MG/1
40 TABLET ORAL DAILY
Qty: 10 TABLET | Refills: 0 | OUTPATIENT
Start: 2025-08-12

## 2025-08-12 RX ORDER — FORMOTEROL FUMARATE 20 UG/2ML
20 SOLUTION RESPIRATORY (INHALATION) EVERY 12 HOURS
Qty: 120 ML | Refills: 11 | Status: SHIPPED | OUTPATIENT
Start: 2025-08-12

## 2025-08-12 RX ORDER — LEVALBUTEROL INHALATION SOLUTION 1.25 MG/3ML
1 SOLUTION RESPIRATORY (INHALATION) 4 TIMES DAILY
Qty: 360 ML | Refills: 3 | OUTPATIENT
Start: 2025-08-12

## 2025-08-12 RX ORDER — ALBUTEROL SULFATE 90 UG/1
2 AEROSOL, METERED RESPIRATORY (INHALATION) EVERY 6 HOURS PRN
Qty: 54 G | Refills: 3 | Status: SHIPPED | OUTPATIENT
Start: 2025-08-12

## 2025-08-12 RX ORDER — ALBUTEROL SULFATE 90 UG/1
2 AEROSOL, METERED RESPIRATORY (INHALATION) EVERY 6 HOURS PRN
Qty: 54 G | Refills: 3 | OUTPATIENT
Start: 2025-08-12

## (undated) RX ORDER — ALBUTEROL SULFATE 0.83 MG/ML
SOLUTION RESPIRATORY (INHALATION)
Status: DISPENSED
Start: 2017-11-16